# Patient Record
Sex: FEMALE | Race: WHITE | NOT HISPANIC OR LATINO | Employment: OTHER | URBAN - METROPOLITAN AREA
[De-identification: names, ages, dates, MRNs, and addresses within clinical notes are randomized per-mention and may not be internally consistent; named-entity substitution may affect disease eponyms.]

---

## 2017-03-01 ENCOUNTER — ALLSCRIPTS OFFICE VISIT (OUTPATIENT)
Dept: OTHER | Facility: OTHER | Age: 72
End: 2017-03-01

## 2017-03-01 DIAGNOSIS — Z11.59 ENCOUNTER FOR SCREENING FOR OTHER VIRAL DISEASES: ICD-10-CM

## 2017-03-29 ENCOUNTER — APPOINTMENT (OUTPATIENT)
Dept: LAB | Facility: CLINIC | Age: 72
End: 2017-03-29
Payer: COMMERCIAL

## 2017-03-29 ENCOUNTER — TRANSCRIBE ORDERS (OUTPATIENT)
Dept: LAB | Facility: CLINIC | Age: 72
End: 2017-03-29

## 2017-03-29 DIAGNOSIS — Z11.59 SCREENING EXAMINATION FOR POLIOMYELITIS: Primary | ICD-10-CM

## 2017-03-29 DIAGNOSIS — E11.9 DIABETES MELLITUS, STABLE (HCC): ICD-10-CM

## 2017-03-29 DIAGNOSIS — Z11.59 SCREENING EXAMINATION FOR POLIOMYELITIS: ICD-10-CM

## 2017-03-29 LAB
EST. AVERAGE GLUCOSE BLD GHB EST-MCNC: 154 MG/DL
GLUCOSE P FAST SERPL-MCNC: 89 MG/DL (ref 65–99)
HBA1C MFR BLD: 7 % (ref 4.2–6.3)

## 2017-03-29 PROCEDURE — 83036 HEMOGLOBIN GLYCOSYLATED A1C: CPT

## 2017-03-29 PROCEDURE — 36415 COLL VENOUS BLD VENIPUNCTURE: CPT

## 2017-03-29 PROCEDURE — 82951 GLUCOSE TOLERANCE TEST (GTT): CPT

## 2017-03-29 PROCEDURE — 86803 HEPATITIS C AB TEST: CPT

## 2017-03-30 LAB — HCV AB SER QL: NORMAL

## 2017-04-05 ENCOUNTER — ALLSCRIPTS OFFICE VISIT (OUTPATIENT)
Dept: OTHER | Facility: OTHER | Age: 72
End: 2017-04-05

## 2017-04-05 DIAGNOSIS — E11.9 TYPE 2 DIABETES MELLITUS WITHOUT COMPLICATIONS (HCC): ICD-10-CM

## 2017-04-19 ENCOUNTER — ALLSCRIPTS OFFICE VISIT (OUTPATIENT)
Dept: OTHER | Facility: OTHER | Age: 72
End: 2017-04-19

## 2017-07-25 DIAGNOSIS — E11.9 TYPE 2 DIABETES MELLITUS WITHOUT COMPLICATIONS (HCC): ICD-10-CM

## 2017-07-25 DIAGNOSIS — Z12.31 ENCOUNTER FOR SCREENING MAMMOGRAM FOR MALIGNANT NEOPLASM OF BREAST: ICD-10-CM

## 2017-08-23 ENCOUNTER — ALLSCRIPTS OFFICE VISIT (OUTPATIENT)
Dept: OTHER | Facility: OTHER | Age: 72
End: 2017-08-23

## 2017-10-26 ENCOUNTER — GENERIC CONVERSION - ENCOUNTER (OUTPATIENT)
Dept: OTHER | Facility: OTHER | Age: 72
End: 2017-10-26

## 2017-11-16 ENCOUNTER — TRANSCRIBE ORDERS (OUTPATIENT)
Dept: LAB | Facility: CLINIC | Age: 72
End: 2017-11-16

## 2017-11-16 ENCOUNTER — GENERIC CONVERSION - ENCOUNTER (OUTPATIENT)
Dept: OTHER | Facility: OTHER | Age: 72
End: 2017-11-16

## 2017-11-16 ENCOUNTER — APPOINTMENT (OUTPATIENT)
Dept: LAB | Facility: CLINIC | Age: 72
End: 2017-11-16
Payer: COMMERCIAL

## 2017-11-16 DIAGNOSIS — E11.9 TYPE 2 DIABETES MELLITUS WITHOUT COMPLICATIONS (HCC): ICD-10-CM

## 2017-11-16 LAB
EST. AVERAGE GLUCOSE BLD GHB EST-MCNC: 163 MG/DL
GLUCOSE P FAST SERPL-MCNC: 116 MG/DL (ref 65–99)
HBA1C MFR BLD: 7.3 % (ref 4.2–6.3)

## 2017-11-16 PROCEDURE — 82947 ASSAY GLUCOSE BLOOD QUANT: CPT

## 2017-11-16 PROCEDURE — 36415 COLL VENOUS BLD VENIPUNCTURE: CPT

## 2017-11-16 PROCEDURE — 83036 HEMOGLOBIN GLYCOSYLATED A1C: CPT

## 2017-12-06 ENCOUNTER — ALLSCRIPTS OFFICE VISIT (OUTPATIENT)
Dept: OTHER | Facility: OTHER | Age: 72
End: 2017-12-06

## 2017-12-06 DIAGNOSIS — E11.9 TYPE 2 DIABETES MELLITUS WITHOUT COMPLICATIONS (HCC): ICD-10-CM

## 2017-12-07 NOTE — PROGRESS NOTES
Assessment    1  Benign essential hypertension (401 1) (I10)  2  Diabetes mellitus type 2, insulin dependent (250 00,V58 67) (E11 9,Z79 4)  3  Depression with anxiety (300 4) (F41 8)    Plan  Depression with anxiety    · Start: Escitalopram Oxalate 10 MG Oral Tablet (Lexapro); 1/2 tab x 1 week than one a days  Generalized osteoarthritis of multiple sites    · * DXA BONE DENSITY SPINE HIP AND PELVIS; Status:Active; Requested for:43Ryr3236;   PMH: Encounter for screening colonoscopy    · COLONOSCOPY; Status:Canceled;   PMH: History of screening mammography    · * MAMMO SCREENING BILATERAL W CAD; Status:Active; Requested for:09Irr8463;   PMH: History of type 2 diabetes mellitus    · Stop: GlyBURIDE 5 MG Oral Tablet  Screening for genitourinary condition    · *VB - Urinary Incontinence Screen (Dx Z13 89 Screen for UI); Status:Complete - Retrospective ByProtocol Authorization;   Done: 59HFW9169 12:13PM  Screening for neurological condition    · *VB - Fall Risk Assessment  (Dx Z13 89 Screen for Neurologic Disorder); Status:Complete -Retrospective By Protocol Authorization;   Done: 69ROH9762 12:13PM  Type 2 diabetes mellitus    · (1) BASIC METABOLIC PROFILE; Status:Active; Requested for:26Ona6118;    · (1) HEMOGLOBIN A1C; Status:Active; Requested for:02Tqb9458;    · (1) LIPID PANEL, FASTING; Status:Active; Requested for:08Bwa1142;    · (1) MICROALBUMIN CREATININE RATIO, RANDOM URINE; Status:Active; Requested for:25Hda5403;    · (1) TSH; Status:Active; Requested for:03Xzm0263;    · (1) URINALYSIS (will reflex a microscopy if leukocytes, occult blood, protein or nitrites are not withinnormal limits); Status:Canceled;    · *VB - Eye Exam; Status:Active; Requested for:22Uei5958;    · *VB - Diabetic Eye Exam Co-Management  *  Status: Active  Requested for: 67TDW9438  () Care Summary provided  : Yes   · (1) HEMOGLOBIN A1C ; every 3 months;  Last 27BDC9931; Next 77GEE8573; Status:Active   · (1) MICROALBUMIN CREATININE RATIO, RANDOM URINE ; every 1 year; Last 20DQW7682; QLTU94BVN3158; Status:Active    Discussion/Summary  Counseling Documentation With Imm: The patient was counseled regarding diagnostic results,-- prognosis,-- impressions  Chief Complaint  Chief Complaint Free Text Note Form: pt here for 3 month f/u of HTNwould like to discuss pneumonia vaccine and shinglesblood work      History of Present Illness  Depression (Follow-Up): The patient states her depression has doing better, but been stable since the last visit  She has had no significant interval events  Interval Symptoms: The patient is currently asymptomatic  worsened depressed mood,-- worsened loss of interest or pleasure in activities,-- stable insomnia-- and-- worsened anxiety  Associated symptoms include: no recent weight gain,-- no recent weight loss,-- no thoughts of suicide,-- no social difficulties,-- no delusions,-- no racing thoughts,-- no employment difficulties,-- no auditory hallucinations,-- no periods of excess energy,-- no financial difficulties,-- no visual hallucinations,-- no periods of euphoria-- and-- no difficulty with activities of daily living  Hypertension (Follow-Up): The patient presents for follow-up of essential hypertension  The patient states she has been doing poorly with her blood pressure control since the last visit  She has no comorbid illnesses  She has no significant interval events  Symptoms: The patient is currently asymptomatic  denies dyspnea,-- denies chest pain,-- denies intermittent leg claudication,-- denies lower extremity edema-- and-- denies   Associated symptoms include increase BP Home BP ready requested  Diabetes Type II (Follow-Up): The patient states she has been doing well with her Type II Diabetes control since the last visit increase A1c , but was taking cortisone shot in knee, episodes of hypoglycemia in am  She has no comorbid illnesses  Complications: peripheral neuropathy-- and-- nephropathy   She has no known diabetic complications  She has no significant interval events  Symptoms: The patient is currently asymptomatic  denies numbness of the feet,-- denies a foot ulcer,-- denies foot pain,-- denies vomiting-- and-- denies visual impairment  Associated symptoms include no polyuria-- and-- no recent weight loss  Review of Systems  Complete-Female:  Constitutional: No fever, no chills, feels well, no tiredness, no recent weight gain or weight loss  Eyes: No complaints of eye pain, no red eyes, no eyesight problems, no discharge, no dry eyes, no itching of eyes  ENT: no complaints of earache, no loss of hearing, no nose bleeds, no nasal discharge, no sore throat, no hoarseness  Cardiovascular: No complaints of slow heart rate, no fast heart rate, no chest pain, no palpitations, no leg claudication, no lower extremity edema  Respiratory: No complaints of shortness of breath, no wheezing, no cough, no SOB on exertion, no orthopnea, no PND  Gastrointestinal: No complaints of abdominal pain, no constipation, no nausea or vomiting, no diarrhea, no bloody stools  Genitourinary: No complaints of dysuria, no incontinence, no pelvic pain, no dysmenorrhea, no vaginal discharge or bleeding  Musculoskeletal: No complaints of arthralgias, no myalgias, no joint swelling or stiffness, no limb pain or swelling  Integumentary: No complaints of skin rash or lesions, no itching, no skin wounds, no breast pain or lump  Psychiatric: anxiety-- and-- depression  Endocrine: No complaints of proptosis, no hot flashes, no muscle weakness, no deepening of the voice, no feelings of weakness  Preventive Quality 65 Older:  Preventive Quality 65 and Older: Falls Risk: The patient fell 0 times in the past 12 months  Symptoms Include: leg weakness  The patient is currently experiencing fall symptoms  The patient is currently experiencing urinary symptoms   Urinary Incontinence Symptoms includes: nocturia       Active Problems  1  Anemia (285 9) (D64 9)  2  Benign essential hypertension (401 1) (I10)  3  Cerebral infarction due to cerebral artery occlusion (434 91) (I63 50)  4  Depression with anxiety (300 4) (F41 8)  5  Diabetes mellitus type 2, insulin dependent (250 00,V58 67) (E11 9,Z79 4)  6  Gastroesophageal reflux disease without esophagitis (530 81) (K21 9)  7  Generalized osteoarthritis of multiple sites (715 09) (M15 9)  8  Insomnia (780 52) (G47 00)  9  Restless leg syndrome (333 94) (G25 81)  10  Type 2 diabetes mellitus (250 00) (E11 9)    Past Medical History  1  History of Acute non-recurrent maxillary sinusitis (461 0) (J01 00)  2  History of Anxiety (300 00) (F41 9)  3  History of Benign neoplasm of skin of upper limb, including shoulder (216 6) (D23 60)  4  History of Chronic low back pain (724 2,338 29) (M54 5,G89 29)  5  History of abscess of skin and subcutaneous tissue (V13 3) (Z87 2)  6  History of back pain (V13 59) (Z87 39)  7  History of benign neoplasm of skin (V13 3) (Z87 2)  8  History of dehydration (V12 29) (Z86 39)  9  History of eczema (V13 3) (Z87 2)  10  History of hypertension (V12 59) (Z86 79)  11  History of type 2 diabetes mellitus (V12 29) (Z86 39)  12  History of urinary tract infection (V13 02) (Z87 440)  13  History of Hordeolum externum of left eye (373 11) (H00 016)  14  History of Mental status change (780 97) (R41 82)  15  History of Pain in eye, unspecified laterality (379 91) (H57 10)  16  History of Restless legs syndrome (333 94) (G25 81)    Surgical History  1  History of Appendectomy  2  History of Cholecystectomy  3  History of Ear Surgery  4  History of Rotator Cuff Repair  5  History of Tonsillectomy With Adenoidectomy  6  History of Umbilical Hernia Repair    Family History  Mother   1  Adopted  Father   2  Adopted  Family History   3   Family history of Family Health Status 6  Children Living    Social History     · Former smoker (D79 76) (N98 148)   · Marital History - Currently    · No alcohol use   · No illicit drug use   · Occupation:   · Travel    Current Meds  1  AmLODIPine Besylate 10 MG Oral Tablet; TAKE 1 TABLET DAILY  Requested for: 26Jun2017; Last TT:72HNC2311 Ordered  2  Aspirin 81 MG TABS; TAKE 1 TABLET TWICE DAILY; Therapy: (Recorded:52Wqn0391) to Recorded  3  Atenolol 25 MG Oral Tablet; TAKE 1 TABLET DAILY; Therapy: 12YLH3413 to (Evaluate:02Oct2017)  Requested for: 67STH3030; Last Rx:05Apr2017 Ordered  4  Atenolol 50 MG Oral Tablet; TAKE 1 TABLET DAILY; Therapy: 18Hdh1889 to (Last Rx:78Siw8932)  Requested for: 38HQB0837 Ordered  5  [employer] Blood Glucose Test In Vitro Strip; TEST TWICE DAILY; Therapy: 50QZG4092 to ([facility])  Requested for: 32WJG0779; Last Rx:09Mar2016 Ordered  6  FreeStyle Lancets Miscellaneous; test twice daily; Therapy: 14GMD3589 to (Last Rx:97Vyq6536)  Requested for: 32Olb8695 Ordered  7  FreeStyle Lite Device; test twicw a day; Therapy: 84NTF4905 to (Last Rx:90Yck3143)  Requested for: 83Ixm2851 Ordered  8  FreeStyle Lite Test In Vitro Strip; test glucose twice daily DX: 250 02; Therapy: 73HSU9506 to (Last Rx:39Rwv1492)  Requested for: 44Vom6530 Ordered  9  GlyBURIDE 5 MG Oral Tablet; Take 1 tablet daily  Requested for: 27PAM0141; Last Rx:10Ejm9335 Ordered  10  Levemir FlexTouch 100 UNIT/ML Subcutaneous Solution Pen-injector; INJECT 28 UNIT Bedtime; Therapy: 81DZO1555 to  Requested for: 68WVY1645 Recorded  11  Levocetirizine Dihydrochloride 5 MG Oral Tablet; TAKE 1 TABLET DAILY IN THE EVENING AS  NEEDED; Therapy: 26YCM9399 to (Evaluate:18Apr2017)  Requested for: 65RQG1238; Last Rx:02Ffi6111  Ordered  12  Metaxalone 800 MG Oral Tablet; take one tablet by mouth at bedtime as needed; Therapy: 07Vvl7526 to (Last Rx:51Nyq4746)  Requested for: 63ZFG4990 Ordered  13  MetFORMIN HCl - 1000 MG Oral Tablet; TAKE 1 TABLET TWICE DAILY  Requested for: 35RQP3237;  Last Rx:35Zxd9549 Ordered  14   NovoFine Plus 32G X 4 MM Miscellaneous; USE 1 TIME DAILY WITH INSULIN PEN - DX  E11 9; Therapy: 57REG8502 to (Evaluate:19Bbf0445)  Requested for: 91JJP7599; Last Rx:20Nov2017  Ordered  15  Nystatin 487718 UNIT/GM External Powder; APPLY SPARINGLY TO AFFECTED AREA(S) TWICE  DAILY; Therapy: 10UGK6134 to (Evaluate:64Crg5026)  Requested for: 05Apr2017; Last Rx:05Apr2017  Ordered  16  Simvastatin 40 MG Oral Tablet; TAKE 1 TABLET DAILY; Therapy: 42IBV2543 to (Evaluate:31Mar2018)  Requested for: 05Apr2017; Last Rx:05Apr2017  Ordered  17  Tylenol TABS; Take as needed; Therapy: (Recorded:45Omf9954) to Recorded  Medication List Reviewed: The medication list was reviewed and updated today  Allergies  1  Zestril TABS  2  Lisinopril TABS  3  Omnicef CAPS  4  Penicillins  5  No Known Environmental Allergies  6  No Known Food Allergies    Vitals  Vital Signs    Recorded: 88ACW4907 11:44AM   Temperature 98 3 F, Oral   Heart Rate 84   Systolic 560, LUE, Sitting   Diastolic 82, LUE, Sitting   BP CUFF SIZE Large   Height 5 ft 7 5 in   Weight 261 lb 4 oz   BMI Calculated 40 31   BSA Calculated 2 28   O2 Saturation 98, RA       Physical Exam   Constitutional  General appearance: No acute distress, well appearing and well nourished  Eyes  Conjunctiva and lids: No swelling, erythema or discharge  Pupils and irises: Equal, round and reactive to light  Ears, Nose, Mouth, and Throat  External inspection of ears and nose: Normal    Oropharynx: Normal with no erythema, edema, exudate or lesions  Pulmonary  Respiratory effort: No increased work of breathing or signs of respiratory distress  Auscultation of lungs: Clear to auscultation  Cardiovascular  Auscultation of heart: Normal rate and rhythm, normal S1 and S2, without murmurs  Lymphatic  Palpation of lymph nodes in neck: No lymphadenopathy  Musculoskeletal  Gait and station: Normal    Digits and nails: Normal without clubbing or cyanosis     Inspection/palpation of joints, bones, and muscles: Normal    Skin Skin and subcutaneous tissue: Normal without rashes or lesions  Neurologic  Cranial nerves: Cranial nerves 2-12 intact  Reflexes: 2+ and symmetric  Sensation: No sensory loss  Psychiatric  Orientation to person, place, and time: Normal    Mood and affect: Normal    Diabetic Foot Screen: Normal    Socks and shoes removed, the Right Foot: the foot was normal, no swelling, no erythema  The toes on the right were normal   The sensory exam showed  normal vibratory sensation at the level of the toes on the right  Normal tactile sensation with monofilament testing throughout the right foot  Socks and shoes removed, Left Foot: the foot was normal, no swelling, no erythema  The toes on the left were normal   Pulses:  1+ in the posterior tibialis on the right  1+ in the dorsalis pedis on the right  Pulses:  1+ in the posterior tibialis on the left  1+ in the dorsalis pedis on the left  Results/Data  *VB - Fall Risk Assessment  (Dx Z13 89 Screen for Neurologic Disorder) 25TAX6286 12:13PM [facility] [place]     Test Name Result Flag Reference   Falls Risk      No falls in the past year     *VB - Urinary Incontinence Screen (Dx Z13 89 Screen for UI) 87NQH4735 12:13PM [facility]     Test Name Result Flag Reference   Urinary Incontinence Assessment 34MRQ3886       (1) GLUCOSE,  FASTING 57IVB4971 07:56AM [employer] Order Number: [id number]     Test Name Result Flag Reference   GLUCOSE FASTING 116 mg/dL H 65-99   Specimen collection should occur prior to Sulfasalazine administration due to the potential for falsely depressed results  Specimen collection should occur prior to Sulfapyridine administration due to the potential for falsely elevated results  (1) HEMOGLOBIN A1C 93XEI1824 07:56AM [facility] Order Number: [id number]     Test Name Result Flag Reference   HEMOGLOBIN A1C 7 3 % H 4 2-6 3   EST  AVG   GLUCOSE 163 mg/dl         Health Management  Type 2 diabetes mellitus (1) BASIC METABOLIC PROFILE; every 3 months; Last 79IRI5447; Next Due: 59Iee7259; Overdue  (1) HEMOGLOBIN A1C; every 3 months; Last 91PWO7521; Next Due: 82HFM3278; Active  (1) MICROALBUMIN CREATININE RATIO, RANDOM URINE; every 1 year; Last 07RCF6863; Next Due:49Emf5483;  Active    Signatures   Electronically signed by : Delia Gomez MD; Dec  6 2017 12:36PM EST                       (Author)    Electronically signed by : Delia Gomez MD; Dec  6 2017 12:37PM EST                       (Author)

## 2018-01-10 ENCOUNTER — GENERIC CONVERSION - ENCOUNTER (OUTPATIENT)
Dept: OTHER | Facility: OTHER | Age: 73
End: 2018-01-10

## 2018-01-12 VITALS
SYSTOLIC BLOOD PRESSURE: 136 MMHG | WEIGHT: 263.38 LBS | HEIGHT: 67 IN | TEMPERATURE: 97.2 F | HEART RATE: 84 BPM | DIASTOLIC BLOOD PRESSURE: 84 MMHG | OXYGEN SATURATION: 95 % | BODY MASS INDEX: 41.34 KG/M2

## 2018-01-12 VITALS
HEART RATE: 73 BPM | SYSTOLIC BLOOD PRESSURE: 138 MMHG | HEIGHT: 68 IN | OXYGEN SATURATION: 98 % | TEMPERATURE: 98.3 F | WEIGHT: 254.38 LBS | BODY MASS INDEX: 38.55 KG/M2 | DIASTOLIC BLOOD PRESSURE: 84 MMHG

## 2018-01-14 VITALS
TEMPERATURE: 97.6 F | DIASTOLIC BLOOD PRESSURE: 70 MMHG | WEIGHT: 263.38 LBS | BODY MASS INDEX: 41.34 KG/M2 | OXYGEN SATURATION: 93 % | SYSTOLIC BLOOD PRESSURE: 142 MMHG | HEIGHT: 67 IN | HEART RATE: 102 BPM

## 2018-01-15 VITALS
BODY MASS INDEX: 41.61 KG/M2 | SYSTOLIC BLOOD PRESSURE: 150 MMHG | TEMPERATURE: 98.6 F | HEART RATE: 86 BPM | DIASTOLIC BLOOD PRESSURE: 88 MMHG | HEIGHT: 67 IN | OXYGEN SATURATION: 97 % | WEIGHT: 265.13 LBS

## 2018-01-18 ENCOUNTER — GENERIC CONVERSION - ENCOUNTER (OUTPATIENT)
Dept: OTHER | Facility: OTHER | Age: 73
End: 2018-01-18

## 2018-01-23 VITALS
SYSTOLIC BLOOD PRESSURE: 124 MMHG | TEMPERATURE: 98.3 F | HEART RATE: 84 BPM | WEIGHT: 261.25 LBS | BODY MASS INDEX: 39.59 KG/M2 | OXYGEN SATURATION: 98 % | HEIGHT: 68 IN | DIASTOLIC BLOOD PRESSURE: 82 MMHG

## 2018-01-24 VITALS
WEIGHT: 257.38 LBS | HEART RATE: 102 BPM | BODY MASS INDEX: 39.01 KG/M2 | TEMPERATURE: 98 F | DIASTOLIC BLOOD PRESSURE: 70 MMHG | SYSTOLIC BLOOD PRESSURE: 148 MMHG | HEIGHT: 68 IN | OXYGEN SATURATION: 96 %

## 2018-01-24 VITALS
BODY MASS INDEX: 39.46 KG/M2 | WEIGHT: 260.38 LBS | TEMPERATURE: 98 F | SYSTOLIC BLOOD PRESSURE: 140 MMHG | DIASTOLIC BLOOD PRESSURE: 78 MMHG | HEART RATE: 116 BPM | OXYGEN SATURATION: 98 % | HEIGHT: 68 IN

## 2018-02-06 ENCOUNTER — OFFICE VISIT (OUTPATIENT)
Dept: INTERNAL MEDICINE CLINIC | Facility: CLINIC | Age: 73
End: 2018-02-06
Payer: COMMERCIAL

## 2018-02-06 VITALS
BODY MASS INDEX: 40.12 KG/M2 | DIASTOLIC BLOOD PRESSURE: 74 MMHG | TEMPERATURE: 98.5 F | WEIGHT: 255.6 LBS | SYSTOLIC BLOOD PRESSURE: 130 MMHG | OXYGEN SATURATION: 98 % | HEIGHT: 67 IN | HEART RATE: 96 BPM

## 2018-02-06 DIAGNOSIS — I10 BENIGN ESSENTIAL HYPERTENSION: Primary | ICD-10-CM

## 2018-02-06 DIAGNOSIS — E11.9 DIABETES MELLITUS TYPE 2, INSULIN DEPENDENT (HCC): ICD-10-CM

## 2018-02-06 DIAGNOSIS — K21.9 GASTROESOPHAGEAL REFLUX DISEASE WITHOUT ESOPHAGITIS: ICD-10-CM

## 2018-02-06 DIAGNOSIS — G89.29 CHRONIC BILATERAL THORACIC BACK PAIN: ICD-10-CM

## 2018-02-06 DIAGNOSIS — Z79.4 DIABETES MELLITUS TYPE 2, INSULIN DEPENDENT (HCC): ICD-10-CM

## 2018-02-06 DIAGNOSIS — M54.6 CHRONIC BILATERAL THORACIC BACK PAIN: ICD-10-CM

## 2018-02-06 DIAGNOSIS — E55.9 VITAMIN D DEFICIENCY: ICD-10-CM

## 2018-02-06 PROBLEM — M54.9 BACK PAIN: Status: ACTIVE | Noted: 2017-04-19

## 2018-02-06 PROBLEM — R60.0 BILATERAL LOWER EXTREMITY EDEMA: Status: ACTIVE | Noted: 2018-01-10

## 2018-02-06 PROBLEM — B00.1 COLD SORE: Status: ACTIVE | Noted: 2018-01-18

## 2018-02-06 PROCEDURE — 99214 OFFICE O/P EST MOD 30 MIN: CPT | Performed by: INTERNAL MEDICINE

## 2018-02-06 RX ORDER — AMLODIPINE BESYLATE 10 MG/1
1 TABLET ORAL DAILY
COMMUNITY
End: 2018-06-05 | Stop reason: SDUPTHER

## 2018-02-06 RX ORDER — ATENOLOL 25 MG/1
1 TABLET ORAL DAILY
COMMUNITY
Start: 2015-03-10 | End: 2018-02-12 | Stop reason: SDUPTHER

## 2018-02-06 RX ORDER — ACETAMINOPHEN 325 MG/1
325 TABLET ORAL EVERY 6 HOURS PRN
COMMUNITY

## 2018-02-06 RX ORDER — HYDROCHLOROTHIAZIDE 12.5 MG/1
1 TABLET ORAL DAILY
COMMUNITY
Start: 2018-01-10 | End: 2018-06-05 | Stop reason: SDUPTHER

## 2018-02-06 RX ORDER — ESCITALOPRAM OXALATE 10 MG/1
TABLET ORAL
COMMUNITY
Start: 2017-12-06 | End: 2018-05-15 | Stop reason: SDUPTHER

## 2018-02-06 RX ORDER — ATENOLOL 50 MG/1
1 TABLET ORAL DAILY
COMMUNITY
Start: 2014-09-15 | End: 2018-02-12 | Stop reason: SDUPTHER

## 2018-02-06 RX ORDER — NYSTATIN 100000 [USP'U]/G
POWDER TOPICAL AS NEEDED
COMMUNITY
Start: 2014-01-24

## 2018-02-06 RX ORDER — TRAMADOL HYDROCHLORIDE 50 MG/1
50 TABLET ORAL EVERY 8 HOURS PRN
Qty: 30 TABLET | Refills: 0 | Status: SHIPPED | OUTPATIENT
Start: 2018-02-06 | End: 2018-07-09 | Stop reason: ALTCHOICE

## 2018-02-06 RX ORDER — SIMVASTATIN 40 MG
1 TABLET ORAL DAILY
COMMUNITY
Start: 2013-11-12 | End: 2018-04-30 | Stop reason: SDUPTHER

## 2018-02-06 NOTE — ASSESSMENT & PLAN NOTE
Continue with metformin 1000 mg twice a day and will increase Levemir to 32 units every morning  Recheck hemoglobin A1c  Advised patient continue to check her blood sugars twice a day and have her call our offices with results

## 2018-02-06 NOTE — ASSESSMENT & PLAN NOTE
Will prescribe tramadol 50 mg Q8H-PRN for moderate pain  Encourage stretching and exercise plans   Patient deferred on PT

## 2018-02-06 NOTE — PROGRESS NOTES
Assessment/Plan:    Gastroesophageal reflux disease without esophagitis   Stable  Continue with lifestyle modifications  Diabetes mellitus type 2, insulin dependent ([facility] [address] )   Continue with metformin 1000 mg twice a day and will increase Levemir to 32 units every morning  Recheck hemoglobin A1c  Advised patient continue to check her blood sugars twice a day and have her call our offices with results  Benign essential hypertension  Stable, Continue with current regimen (HCTZ 12 5mg daily, Norvasc 10mg daily)  Back pain    Will prescribe tramadol 50 mg Q8H-PRN for moderate pain  Encourage stretching and exercise plans  Patient deferred on PT  Diagnoses and all orders for this visit:    Benign essential hypertension    Diabetes mellitus type 2, insulin dependent ([facility])  -     insulin detemir (LEVEMIR FLEXTOUCH) subcutaneous injection pen 100 units/mL; Inject 32 Units under the skin every morning    Gastroesophageal reflux disease without esophagitis    Chronic bilateral thoracic back pain  -     traMADol (ULTRAM) 50 mg tablet; Take 1 tablet (50 mg total) by mouth every 8 (eight) hours as needed for moderate pain    Vitamin D deficiency  -     Vitamin D 25 hydroxy; Future    Other orders  -     aspirin 81 MG tablet; Take 1 tablet by mouth 2 (two) times a day  -     amLODIPine (NORVASC) 10 mg tablet; Take 1 tablet by mouth daily  -     atenolol (TENORMIN) 25 mg tablet; Take 1 tablet by mouth daily  -     atenolol (TENORMIN) 50 mg tablet; Take 1 tablet by mouth daily  -     escitalopram (LEXAPRO) 10 mg tablet; Take by mouth  -     hydrochlorothiazide (HYDRODIURIL) 12 5 mg tablet; Take 1 tablet by mouth daily  -     Discontinue: insulin detemir (LEVEMIR FLEXTOUCH) subcutaneous injection pen 100 units/mL; Inject under the skin  -     metFORMIN (GLUCOPHAGE) 1000 MG tablet; Take 1 tablet by mouth 2 (two) times a day  -     simvastatin (ZOCOR) 40 mg tablet;  Take 1 tablet by mouth daily  -     nystatin (MYCOSTATIN) powder; Apply topically 2 (two) times a day  -     acetaminophen (TYLENOL) 325 mg tablet; Take by mouth          Subjective:      Patient ID: Sofia Davis is a 67 y o  female  The following portions of the patient's history were reviewed and updated as appropriate: allergies, current medications, past family history, past medical history, past social history, past surgical history and problem list     70-year-old female is seen today for follow-up  She reports compliance with her medication regimen and started a diabetic diet to which she counts her carbohydrate intake however despite diet modifications, blood sugars remained uncontrolled  Blood sugar ranging from 160-180  She has also taken metformin 1000 mg twice a day  She was previously on 30 mg glyburide daily however this was discontinued due to frequent hypoglycemic episodes  Neck Pain    This is a chronic problem  The current episode started more than 1 year ago  The problem occurs constantly  The problem has been gradually worsening  The pain is present in the left side and right side  The quality of the pain is described as aching  The pain is at a severity of 8/10  The pain is moderate  The symptoms are aggravated by bending, position and twisting  The pain is same all the time  Stiffness is present all day  Pertinent negatives include no chest pain or fever  She has tried acetaminophen for the symptoms  The treatment provided no relief  Diabetes   She presents for her follow-up diabetic visit  She has type 2 diabetes mellitus  Her disease course has been fluctuating  Hypoglycemia symptoms include hunger  Pertinent negatives for hypoglycemia include no dizziness  Pertinent negatives for diabetes include no chest pain and no fatigue  There are no hypoglycemic complications  Symptoms are worsening  Diabetic complications include peripheral neuropathy   Risk factors for coronary artery disease include obesity, sedentary lifestyle, post-menopausal and diabetes mellitus  Current diabetic treatment includes oral agent (monotherapy) and insulin injections  She is compliant with treatment all of the time  She is following a generally healthy diet  She never participates in exercise  Her home blood glucose trend is increasing steadily  Her breakfast blood glucose is taken between 9-10 am  Her breakfast blood glucose range is generally 140-180 mg/dl  Her dinner blood glucose is taken between 6-7 pm  Her dinner blood glucose range is generally 180-200 mg/dl  An ACE inhibitor/angiotensin II receptor blocker is not being taken  Review of Systems   Constitutional: Negative for activity change, appetite change, diaphoresis, fatigue and fever  HENT: Negative for congestion, postnasal drip, rhinorrhea, sinus pain, sinus pressure and sore throat  Eyes: Negative for discharge and itching  Respiratory: Negative for apnea, cough and shortness of breath  Cardiovascular: Negative for chest pain  Gastrointestinal: Negative for abdominal distention, abdominal pain, anal bleeding, blood in stool, constipation, diarrhea, nausea and vomiting  Endocrine: Negative for cold intolerance and heat intolerance  Genitourinary: Negative for difficulty urinating, dysuria and hematuria  Musculoskeletal: Positive for back pain, neck pain and neck stiffness  Negative for arthralgias, gait problem, joint swelling and myalgias  Skin: Negative  Neurological: Negative for dizziness and light-headedness  Hematological: Negative for adenopathy  Psychiatric/Behavioral: Negative for agitation, behavioral problems, hallucinations, sleep disturbance and suicidal ideas           Past Medical History:   Diagnosis Date    Anxiety     Depression     [facility]          Current Outpatient Prescriptions:     acetaminophen (TYLENOL) 325 mg tablet, Take by mouth, Disp: , Rfl:     amLODIPine (NORVASC) 10 mg tablet, Take 1 tablet by mouth daily, Disp: , Rfl:     aspirin 81 MG tablet, Take 1 tablet by mouth 2 (two) times a day, Disp: , Rfl:     atenolol (TENORMIN) 25 mg tablet, Take 1 tablet by mouth daily, Disp: , Rfl:     atenolol (TENORMIN) 50 mg tablet, Take 1 tablet by mouth daily, Disp: , Rfl:     hydrochlorothiazide (HYDRODIURIL) 12 5 mg tablet, Take 1 tablet by mouth daily, Disp: , Rfl:     insulin detemir (LEVEMIR FLEXTOUCH) subcutaneous injection pen 100 units/mL, Inject 32 Units under the skin every morning, Disp: 5 pen, Rfl: 0    metFORMIN (GLUCOPHAGE) 1000 MG tablet, Take 1 tablet by mouth 2 (two) times a day, Disp: , Rfl:     nystatin (MYCOSTATIN) powder, Apply topically 2 (two) times a day, Disp: , Rfl:     simvastatin (ZOCOR) 40 mg tablet, Take 1 tablet by mouth daily, Disp: , Rfl:     escitalopram (LEXAPRO) 10 mg tablet, Take by mouth, Disp: , Rfl:     traMADol (ULTRAM) 50 mg tablet, Take 1 tablet (50 mg total) by mouth every 8 (eight) hours as needed for moderate pain, Disp: 30 tablet, Rfl: 0    Allergies   Allergen Reactions    Cefdinir      Category: Allergy;     Penicillins     Lisinopril Rash     Category: Allergy; Category: Allergy; Annotation - [id number]: ALL FORMS       Social History   Past Surgical History:   Procedure Laterality Date    APPENDECTOMY      CHOLECYSTECTOMY      HERNIA REPAIR       Family History   Problem Relation Age of Onset    No Known Problems Mother     No Known Problems Father        Objective:  /74 (BP Location: Left arm, Patient Position: Sitting, Cuff Size: Large)   Pulse 96   Temp 98 5 °F (36 9 °C) (Oral)   Ht 5' 6 5" (1 689 m)   Wt 116 kg (255 lb 9 6 oz)   SpO2 98%   BMI 40 64 kg/m²     No results found for this or any previous visit (from the past 1344 hour(s))  Physical Exam   Constitutional: She is oriented to person, place, and time  She appears well-developed and well-nourished  HENT:   Head: Normocephalic and atraumatic     Eyes: Conjunctivae and EOM are normal  Pupils are equal, round, and reactive to light  Neck: Normal range of motion  Neck supple  No JVD present  No thyromegaly present  Cardiovascular: Normal rate, normal heart sounds and intact distal pulses  Exam reveals no gallop  No murmur heard  Pulmonary/Chest: Effort normal and breath sounds normal  No respiratory distress  She has no wheezes  She has no rales  She exhibits no tenderness  Abdominal: Soft  Bowel sounds are normal  She exhibits no distension  There is no tenderness  Musculoskeletal: Normal range of motion  She exhibits edema (+1 B/L edema)  She exhibits no tenderness or deformity  Lymphadenopathy:     She has no cervical adenopathy  Neurological: She is alert and oriented to person, place, and time  No cranial nerve deficit  Skin: Skin is warm and dry  No rash noted  No erythema  No pallor  Psychiatric: She has a normal mood and affect  Her behavior is normal  Judgment and thought content normal    Nursing note and vitals reviewed

## 2018-02-12 DIAGNOSIS — I10 HYPERTENSION, UNSPECIFIED TYPE: Primary | ICD-10-CM

## 2018-02-12 RX ORDER — ATENOLOL 25 MG/1
25 TABLET ORAL DAILY
Qty: 30 TABLET | Refills: 3 | Status: SHIPPED | OUTPATIENT
Start: 2018-02-12 | End: 2018-06-05 | Stop reason: SDUPTHER

## 2018-02-12 RX ORDER — ATENOLOL 50 MG/1
50 TABLET ORAL DAILY
Qty: 30 TABLET | Refills: 3 | Status: SHIPPED | OUTPATIENT
Start: 2018-02-12 | End: 2018-06-05 | Stop reason: SDUPTHER

## 2018-02-20 DIAGNOSIS — Z79.4 TYPE 2 DIABETES MELLITUS WITH COMPLICATION, WITH LONG-TERM CURRENT USE OF INSULIN (HCC): Primary | ICD-10-CM

## 2018-02-20 DIAGNOSIS — E11.8 TYPE 2 DIABETES MELLITUS WITH COMPLICATION, WITH LONG-TERM CURRENT USE OF INSULIN (HCC): Primary | ICD-10-CM

## 2018-03-26 DIAGNOSIS — E11.9 DIABETES MELLITUS TYPE 2, INSULIN DEPENDENT (HCC): ICD-10-CM

## 2018-03-26 DIAGNOSIS — Z79.4 DIABETES MELLITUS TYPE 2, INSULIN DEPENDENT (HCC): ICD-10-CM

## 2018-03-26 RX ORDER — INSULIN DETEMIR 100 [IU]/ML
32 INJECTION, SOLUTION SUBCUTANEOUS EVERY MORNING
Qty: 6 ML | Refills: 0 | OUTPATIENT
Start: 2018-03-26

## 2018-03-27 DIAGNOSIS — E11.9 DIABETES MELLITUS TYPE 2, INSULIN DEPENDENT (HCC): ICD-10-CM

## 2018-03-27 DIAGNOSIS — Z79.4 DIABETES MELLITUS TYPE 2, INSULIN DEPENDENT (HCC): ICD-10-CM

## 2018-04-25 RX ORDER — PEN NEEDLE, DIABETIC 31 GX5/16"
NEEDLE, DISPOSABLE MISCELLANEOUS
Qty: 100 EACH | Refills: 0 | OUTPATIENT
Start: 2018-04-25

## 2018-04-27 RX ORDER — SIMVASTATIN 40 MG
TABLET ORAL
Qty: 90 TABLET | Refills: 2 | OUTPATIENT
Start: 2018-04-27

## 2018-04-30 DIAGNOSIS — E78.00 HYPERCHOLESTEROLEMIA: Primary | ICD-10-CM

## 2018-04-30 RX ORDER — SIMVASTATIN 40 MG
40 TABLET ORAL DAILY
Qty: 30 TABLET | Refills: 3 | Status: SHIPPED | OUTPATIENT
Start: 2018-04-30 | End: 2018-06-05 | Stop reason: SDUPTHER

## 2018-05-11 DIAGNOSIS — I10 HYPERTENSION, UNSPECIFIED TYPE: ICD-10-CM

## 2018-05-14 RX ORDER — ESCITALOPRAM OXALATE 10 MG/1
TABLET ORAL
Qty: 30 TABLET | Refills: 1 | OUTPATIENT
Start: 2018-05-14

## 2018-05-14 RX ORDER — ATENOLOL 50 MG/1
TABLET ORAL
Qty: 30 TABLET | Refills: 2 | OUTPATIENT
Start: 2018-05-14

## 2018-05-15 DIAGNOSIS — E11.9 TYPE 2 DIABETES MELLITUS TREATED WITH INSULIN (HCC): Primary | ICD-10-CM

## 2018-05-15 DIAGNOSIS — Z79.4 TYPE 2 DIABETES MELLITUS TREATED WITH INSULIN (HCC): Primary | ICD-10-CM

## 2018-05-15 DIAGNOSIS — F41.8 DEPRESSION WITH ANXIETY: ICD-10-CM

## 2018-05-15 RX ORDER — ESCITALOPRAM OXALATE 10 MG/1
10 TABLET ORAL DAILY
Qty: 30 TABLET | Refills: 0 | Status: SHIPPED | OUTPATIENT
Start: 2018-05-15 | End: 2018-06-05 | Stop reason: SDUPTHER

## 2018-05-24 ENCOUNTER — TRANSCRIBE ORDERS (OUTPATIENT)
Dept: LAB | Facility: CLINIC | Age: 73
End: 2018-05-24

## 2018-05-24 ENCOUNTER — APPOINTMENT (OUTPATIENT)
Dept: LAB | Facility: CLINIC | Age: 73
End: 2018-05-24
Payer: COMMERCIAL

## 2018-05-24 DIAGNOSIS — E11.9 TYPE 2 DIABETES MELLITUS WITHOUT COMPLICATIONS (HCC): ICD-10-CM

## 2018-05-24 DIAGNOSIS — E55.9 VITAMIN D DEFICIENCY: ICD-10-CM

## 2018-05-24 LAB
25(OH)D3 SERPL-MCNC: 23.9 NG/ML (ref 30–100)
ANION GAP SERPL CALCULATED.3IONS-SCNC: 5 MMOL/L (ref 4–13)
BUN SERPL-MCNC: 18 MG/DL (ref 5–25)
CALCIUM SERPL-MCNC: 9.6 MG/DL (ref 8.3–10.1)
CHLORIDE SERPL-SCNC: 105 MMOL/L (ref 100–108)
CHOLEST SERPL-MCNC: 80 MG/DL (ref 50–200)
CO2 SERPL-SCNC: 31 MMOL/L (ref 21–32)
CREAT SERPL-MCNC: 0.7 MG/DL (ref 0.6–1.3)
CREAT UR-MCNC: 131 MG/DL
EST. AVERAGE GLUCOSE BLD GHB EST-MCNC: 157 MG/DL
GFR SERPL CREATININE-BSD FRML MDRD: 87 ML/MIN/1.73SQ M
GLUCOSE P FAST SERPL-MCNC: 159 MG/DL (ref 65–99)
HBA1C MFR BLD: 7.1 % (ref 4.2–6.3)
HDLC SERPL-MCNC: 39 MG/DL (ref 40–60)
LDLC SERPL CALC-MCNC: 14 MG/DL (ref 0–100)
MICROALBUMIN UR-MCNC: 236 MG/L (ref 0–20)
MICROALBUMIN/CREAT 24H UR: 180 MG/G CREATININE (ref 0–30)
NONHDLC SERPL-MCNC: 41 MG/DL
POTASSIUM SERPL-SCNC: 3.8 MMOL/L (ref 3.5–5.3)
SODIUM SERPL-SCNC: 141 MMOL/L (ref 136–145)
TRIGL SERPL-MCNC: 137 MG/DL
TSH SERPL DL<=0.05 MIU/L-ACNC: 2.54 UIU/ML (ref 0.36–3.74)

## 2018-05-24 PROCEDURE — 83036 HEMOGLOBIN GLYCOSYLATED A1C: CPT

## 2018-05-24 PROCEDURE — 84443 ASSAY THYROID STIM HORMONE: CPT

## 2018-05-24 PROCEDURE — 3060F POS MICROALBUMINURIA REV: CPT | Performed by: INTERNAL MEDICINE

## 2018-05-24 PROCEDURE — 80048 BASIC METABOLIC PNL TOTAL CA: CPT

## 2018-05-24 PROCEDURE — 82306 VITAMIN D 25 HYDROXY: CPT

## 2018-05-24 PROCEDURE — 82570 ASSAY OF URINE CREATININE: CPT

## 2018-05-24 PROCEDURE — 80061 LIPID PANEL: CPT

## 2018-05-24 PROCEDURE — 82043 UR ALBUMIN QUANTITATIVE: CPT

## 2018-05-24 PROCEDURE — 36415 COLL VENOUS BLD VENIPUNCTURE: CPT

## 2018-06-05 ENCOUNTER — OFFICE VISIT (OUTPATIENT)
Dept: INTERNAL MEDICINE CLINIC | Facility: CLINIC | Age: 73
End: 2018-06-05
Payer: COMMERCIAL

## 2018-06-05 VITALS
HEART RATE: 83 BPM | DIASTOLIC BLOOD PRESSURE: 80 MMHG | HEIGHT: 67 IN | OXYGEN SATURATION: 96 % | TEMPERATURE: 97.9 F | BODY MASS INDEX: 39.93 KG/M2 | WEIGHT: 254.4 LBS | SYSTOLIC BLOOD PRESSURE: 126 MMHG

## 2018-06-05 DIAGNOSIS — E78.2 MIXED HYPERLIPIDEMIA: ICD-10-CM

## 2018-06-05 DIAGNOSIS — I10 HYPERTENSION, UNSPECIFIED TYPE: ICD-10-CM

## 2018-06-05 DIAGNOSIS — F41.8 DEPRESSION WITH ANXIETY: ICD-10-CM

## 2018-06-05 DIAGNOSIS — I10 BENIGN ESSENTIAL HYPERTENSION: Primary | ICD-10-CM

## 2018-06-05 DIAGNOSIS — R60.0 BILATERAL LOWER EXTREMITY EDEMA: ICD-10-CM

## 2018-06-05 DIAGNOSIS — E78.00 HYPERCHOLESTEROLEMIA: ICD-10-CM

## 2018-06-05 DIAGNOSIS — E55.9 VITAMIN D DEFICIENCY: ICD-10-CM

## 2018-06-05 DIAGNOSIS — E11.9 DIABETES MELLITUS TYPE 2, INSULIN DEPENDENT (HCC): ICD-10-CM

## 2018-06-05 DIAGNOSIS — I63.50 CEREBRAL INFARCTION DUE TO CEREBRAL ARTERY OCCLUSION (HCC): ICD-10-CM

## 2018-06-05 DIAGNOSIS — G25.81 RESTLESS LEG SYNDROME: ICD-10-CM

## 2018-06-05 DIAGNOSIS — M15.9 GENERALIZED OSTEOARTHRITIS OF MULTIPLE SITES: ICD-10-CM

## 2018-06-05 DIAGNOSIS — Z79.4 DIABETES MELLITUS TYPE 2, INSULIN DEPENDENT (HCC): ICD-10-CM

## 2018-06-05 PROCEDURE — 99214 OFFICE O/P EST MOD 30 MIN: CPT | Performed by: FAMILY MEDICINE

## 2018-06-05 RX ORDER — ERGOCALCIFEROL 1.25 MG/1
50000 CAPSULE ORAL WEEKLY
Qty: 8 CAPSULE | Refills: 0 | Status: SHIPPED | OUTPATIENT
Start: 2018-06-05 | End: 2019-04-03

## 2018-06-05 RX ORDER — HYDROCHLOROTHIAZIDE 25 MG/1
25 TABLET ORAL DAILY
Qty: 90 TABLET | Refills: 1 | Status: SHIPPED | OUTPATIENT
Start: 2018-06-05 | End: 2018-11-30 | Stop reason: SDUPTHER

## 2018-06-05 RX ORDER — ATENOLOL 50 MG/1
50 TABLET ORAL DAILY
Qty: 30 TABLET | Refills: 0 | Status: SHIPPED | OUTPATIENT
Start: 2018-06-05 | End: 2018-07-09 | Stop reason: SDUPTHER

## 2018-06-05 RX ORDER — ATENOLOL 25 MG/1
25 TABLET ORAL DAILY
Qty: 90 TABLET | Refills: 1 | Status: SHIPPED | OUTPATIENT
Start: 2018-06-05 | End: 2018-12-10 | Stop reason: SDUPTHER

## 2018-06-05 RX ORDER — HYDROCHLOROTHIAZIDE 12.5 MG/1
25 TABLET ORAL DAILY
Qty: 90 TABLET | Refills: 1 | Status: SHIPPED | OUTPATIENT
Start: 2018-06-05 | End: 2018-06-05 | Stop reason: SDUPTHER

## 2018-06-05 RX ORDER — AMLODIPINE BESYLATE 5 MG/1
5 TABLET ORAL DAILY
Qty: 90 TABLET | Refills: 1 | Status: SHIPPED | OUTPATIENT
Start: 2018-06-05 | End: 2018-12-03 | Stop reason: SDUPTHER

## 2018-06-05 RX ORDER — AMLODIPINE BESYLATE 10 MG/1
5 TABLET ORAL DAILY
Qty: 90 TABLET | Refills: 1 | Status: SHIPPED | OUTPATIENT
Start: 2018-06-05 | End: 2018-06-05 | Stop reason: SDUPTHER

## 2018-06-05 RX ORDER — SIMVASTATIN 40 MG
40 TABLET ORAL DAILY
Qty: 90 TABLET | Refills: 1 | Status: SHIPPED | OUTPATIENT
Start: 2018-06-05 | End: 2019-01-21 | Stop reason: SDUPTHER

## 2018-06-05 RX ORDER — ESCITALOPRAM OXALATE 10 MG/1
10 TABLET ORAL DAILY
Qty: 90 TABLET | Refills: 1 | Status: SHIPPED | OUTPATIENT
Start: 2018-06-05 | End: 2018-12-26 | Stop reason: SDUPTHER

## 2018-06-05 NOTE — TELEPHONE ENCOUNTER
Pharmacy asked that new scripts be sent for Amlodipine and HCTZ  Scripts were ordered yesterday and quantity being prescribed didn't match instructions  They wanted scripts changed, so that it would be less confusing for the pt

## 2018-06-05 NOTE — PROGRESS NOTES
Assessment/Plan:    No problem-specific Assessment & Plan notes found for this encounter  1  Benign essential hypertension (401 1) (I10)  2  Diabetes mellitus type 2, insulin dependent (250 00,V58 67) (E11 9,Z79 4)  3  Depression with anxiety (300 4) (F41 8)  Mixed HLD  Vit D   RLS       Problem List Items Addressed This Visit        Endocrine    Diabetes mellitus type 2, insulin dependent ([address] )       Cardiovascular and Mediastinum    Benign essential hypertension - Primary    Cerebral infarction due to cerebral artery occlusion (HCC)       Musculoskeletal and Integument    Generalized osteoarthritis of multiple sites       Other    Bilateral lower extremity edema    Depression with anxiety    Mixed hyperlipidemia    Restless leg syndrome    Vitamin D deficiency      Other Visit Diagnoses     Hypertension, unspecified type        Hypercholesterolemia                Discussion: increase levemir to 34 units, labs reviewed  LE edema due to norvasc,- decrease norvasc to 5 mg and increase HCTZ to 25 mg and check BP , cont with ASA, repet labs in 3-4 months, replete vit D weekly      Subjective:      Patient ID: Thomas Monaco is a 67 y o  female  HPI  Depression (Follow-Up): The patient states her depression has doing better, but been stable since the last visit  She has had no significant interval events  Interval Symptoms: The patient is currently asymptomatic  worsened depressed mood,-- worsened loss of interest or pleasure in activities,-- stable insomnia-- and-- worsened anxiety  Associated symptoms include: no recent weight gain,-- no recent weight loss,-- no thoughts of suicide,-- no social difficulties,-- no delusions,-- no racing thoughts,-- no employment difficulties,-- no auditory hallucinations,-- no periods of excess energy,-- no financial difficulties,-- no visual hallucinations,-- no periods of euphoria-- and-- no difficulty with activities of daily living  Hypertension (Follow-Up):  The patient presents for follow-up of essential hypertension  The patient states she has been doing poorly with her blood pressure control since the last visit  She has no comorbid illnesses  She has no significant interval events  Symptoms: The patient is currently asymptomatic  denies dyspnea,-- denies chest pain,-- denies intermittent leg claudication,--  +  lower extremity edema-- and-- denies   Associated symptoms include increase BP Home BP ready requested  Diabetes Type II (Follow-Up): The patient states she has been doing well with her Type II Diabetes control since the last visit increase A1c , but was taking cortisone shot in knee, episodes of hypoglycemia in am  She has no comorbid illnesses  Complications: peripheral neuropathy-- and-- nephropathy  She has no known diabetic complications  She has no significant interval events  Symptoms: The patient is currently asymptomatic  denies numbness of the feet,-- denies a foot ulcer,-- denies foot pain,-- denies vomiting-- and-- denies visual impairment  Associated symptoms include no polyuria-- and-- no recent weight loss       Aic is 7 01, 7 3 and now 7 1    Review of Systems      Constitutional:  Denies fever or chills   Eyes:  Denies change in visual acuity   HENT:  Denies nasal congestion or sore throat   Respiratory:  Denies cough or shortness of breath or wheezing  Cardiovascular:  Denies palpitations or chest pain, LE edema  GI:  Denies abdominal pain, nausea, or vomiting  Integument:  Denies rash   Neurologic:  Denies headache or focal weakness        Objective:      /80 (BP Location: Left arm, Patient Position: Sitting, Cuff Size: Adult)   Pulse 83   Temp 97 9 °F (36 6 °C) (Oral)   Ht 5' 6 5" (1 689 m)   Wt 115 kg (254 lb 6 4 oz)   SpO2 96%   BMI 40 45 kg/m²          Physical Exam      Constitutional:  Well developed, well nourished, no acute distress, non-toxic appearance   Eyes:  PERRL, conjunctiva normal , non icteric sclera  HENT: Atraumatic, oropharynx moist  Neck-  supple   Respiratory:  CTA b/l, normal breath sounds, no rales, no wheezing   Cardiovascular:  RRR, no murmurs, + 1 LE edema b/l  GI:  Soft, nondistended, normal bowel sounds x 4, nontender, no organomegaly, no mass, no rebound, no guarding   Neurologic:  no focal deficits noted   Psychiatric:  Speech and behavior appropriate , AAO x 3  Skin: mild ecchymosis on arms

## 2018-06-18 RX ORDER — PEN NEEDLE, DIABETIC 31 GX5/16"
NEEDLE, DISPOSABLE MISCELLANEOUS
Qty: 100 EACH | Refills: 0 | OUTPATIENT
Start: 2018-06-18

## 2018-07-05 DIAGNOSIS — I10 HYPERTENSION, UNSPECIFIED TYPE: ICD-10-CM

## 2018-07-05 RX ORDER — ATENOLOL 50 MG/1
TABLET ORAL
Qty: 30 TABLET | Refills: 0 | OUTPATIENT
Start: 2018-07-05

## 2018-07-06 DIAGNOSIS — I10 HYPERTENSION, UNSPECIFIED TYPE: ICD-10-CM

## 2018-07-06 RX ORDER — ATENOLOL 50 MG/1
TABLET ORAL
Qty: 30 TABLET | Refills: 0 | OUTPATIENT
Start: 2018-07-06

## 2018-07-09 ENCOUNTER — OFFICE VISIT (OUTPATIENT)
Dept: INTERNAL MEDICINE CLINIC | Facility: CLINIC | Age: 73
End: 2018-07-09
Payer: COMMERCIAL

## 2018-07-09 VITALS
DIASTOLIC BLOOD PRESSURE: 70 MMHG | RESPIRATION RATE: 20 BRPM | TEMPERATURE: 97.6 F | OXYGEN SATURATION: 95 % | BODY MASS INDEX: 38.92 KG/M2 | HEART RATE: 87 BPM | SYSTOLIC BLOOD PRESSURE: 134 MMHG | HEIGHT: 66 IN | WEIGHT: 242.2 LBS

## 2018-07-09 DIAGNOSIS — I10 HYPERTENSION, UNSPECIFIED TYPE: ICD-10-CM

## 2018-07-09 DIAGNOSIS — M54.2 NECK PAIN: Primary | ICD-10-CM

## 2018-07-09 PROCEDURE — 1160F RVW MEDS BY RX/DR IN RCRD: CPT | Performed by: NURSE PRACTITIONER

## 2018-07-09 PROCEDURE — 99213 OFFICE O/P EST LOW 20 MIN: CPT | Performed by: NURSE PRACTITIONER

## 2018-07-09 RX ORDER — ATENOLOL 50 MG/1
50 TABLET ORAL DAILY
Qty: 90 TABLET | Refills: 0 | Status: SHIPPED | OUTPATIENT
Start: 2018-07-09 | End: 2018-10-03 | Stop reason: SDUPTHER

## 2018-07-09 RX ORDER — MELOXICAM 7.5 MG/1
7.5 TABLET ORAL DAILY
Qty: 30 TABLET | Refills: 0 | Status: SHIPPED | OUTPATIENT
Start: 2018-07-09 | End: 2019-04-03

## 2018-07-09 RX ORDER — METHOCARBAMOL 500 MG/1
500 TABLET, FILM COATED ORAL EVERY 8 HOURS SCHEDULED
Qty: 30 TABLET | Refills: 0 | Status: SHIPPED | OUTPATIENT
Start: 2018-07-09 | End: 2019-04-03

## 2018-07-09 NOTE — PROGRESS NOTES
Assessment/Plan:    Neck Pain:  [place] in nature  Likely 2/2 to trip and pt bracing herself prior to fall  Will start meloxicam and methocarbamol  Continue with icy hot and recommend gentle stretching exercises  If no improvement pt to return for follow-up and would recommend PT  Diagnoses and all orders for this visit:    Neck pain  -     methocarbamol (ROBAXIN) 500 mg tablet; Take 1 tablet (500 mg total) by mouth every 8 (eight) hours  -     meloxicam (MOBIC) 7 5 mg tablet; Take 1 tablet (7 5 mg total) by mouth daily        Subjective:      Patient ID: Pippa Baer is a 67 y o  female  Neck Pain    This is a new problem  Episode onset: 1 5 weeks  The problem occurs constantly  The problem has been unchanged  The pain is associated with a twisting injury  The pain is present in the right side  The quality of the pain is described as aching  The pain is at a severity of 9/10  The symptoms are aggravated by position and twisting  The pain is worse during the day  Pertinent negatives include no chest pain, fever, headaches, numbness, paresis, syncope or weakness  She has tried acetaminophen (icy hot) for the symptoms  The treatment provided no relief  Pt reports her symptoms started after she tripped over a rug 1 5 weeks ago  She did not fall but braced her self with her L arm  No CP/SOB, dizziness, lightheadedness, syncope or near-syncope prior to trip  The following portions of the patient's history were reviewed and updated as appropriate: allergies, current medications, past family history, past medical history, past social history, past surgical history and problem list     Review of Systems   Constitutional: Negative for chills, fatigue and fever  Eyes: Negative for visual disturbance  Respiratory: Negative for cough, shortness of breath and wheezing  Cardiovascular: Negative for chest pain, palpitations and syncope  Gastrointestinal: Negative for nausea and vomiting  Musculoskeletal: Positive for neck pain and neck stiffness  Negative for arthralgias, back pain, gait problem, joint swelling and myalgias  Skin: Negative for rash  Neurological: Negative for dizziness, weakness, light-headedness, numbness and headaches           Past Medical History:   Diagnosis Date    Abscess of skin and subcutaneous tissue     last assessed: 1/24/2014    Anxiety     Benign neoplasm of skin     last assessed: 12/3/2013    Benign neoplasm of skin of upper limb, including shoulder     last assessed: 12/3/2013    Chronic low back pain     last assessed: 12/3/2013    Depression     Diabetes mellitus ([facility] )     Eczema     last assessed: 11/14/2014    Hypertension     Mental status change     last assessed: 12/3/2013    Restless leg syndrome     last assessed: 12/3/2013    Type 2 diabetes mellitus ([facility] )     last assessed: 11/13/2013         Current Outpatient Prescriptions:     acetaminophen (TYLENOL) 325 mg tablet, Take 325 mg by mouth as needed  , Disp: , Rfl:     amLODIPine (NORVASC) 5 mg tablet, Take 1 tablet (5 mg total) by mouth daily, Disp: 90 tablet, Rfl: 1    aspirin 81 MG tablet, Take 1 tablet by mouth daily  , Disp: , Rfl:     atenolol (TENORMIN) 25 mg tablet, Take 1 tablet (25 mg total) by mouth daily, Disp: 90 tablet, Rfl: 1    atenolol (TENORMIN) 50 mg tablet, Take 1 tablet (50 mg total) by mouth daily, Disp: 30 tablet, Rfl: 0    ergocalciferol (VITAMIN D2) 50,000 units, Take 1 capsule (50,000 Units total) by mouth once a week, Disp: 8 capsule, Rfl: 0    escitalopram (LEXAPRO) 10 mg tablet, Take 1 tablet (10 mg total) by mouth daily, Disp: 90 tablet, Rfl: 1    hydrochlorothiazide (HYDRODIURIL) 25 mg tablet, Take 1 tablet (25 mg total) by mouth daily, Disp: 90 tablet, Rfl: 1    insulin detemir (LEVEMIR FLEXTOUCH) 100 Units/mL injection pen, Inject 34 Units under the skin every morning, Disp: 5 pen, Rfl: 5    Insulin Pen Needle (NOVOFINE PLUS) 32G X 4 MM MISC, Use 1 time daily with insulin pen, Disp: 90 each, Rfl: 0    metFORMIN (GLUCOPHAGE) 1000 MG tablet, Take 1 tablet (1,000 mg total) by mouth 2 (two) times a day, Disp: 180 tablet, Rfl: 1    nystatin (MYCOSTATIN) powder, Apply topically as needed  , Disp: , Rfl:     simvastatin (ZOCOR) 40 mg tablet, Take 1 tablet (40 mg total) by mouth daily, Disp: 90 tablet, Rfl: 1    Allergies   Allergen Reactions    Cefdinir      Category: Allergy;     Penicillins     Lisinopril Rash     Category: Allergy; Category: Allergy; Annotation - [id number]: ALL FORMS       Social History   Past Surgical History:   Procedure Laterality Date    APPENDECTOMY      CHOLECYSTECTOMY      EAR SURGERY Left     HERNIA REPAIR      ROTATOR CUFF REPAIR Right     done by Dr John Gilliam       Family History   Problem Relation Age of Onset    Adopted: Yes    No Known Problems Mother     No Known Problems Father        Objective:  /70 (BP Location: Right arm, Patient Position: Sitting, Cuff Size: Large)   Pulse 87   Temp 97 6 °F (36 4 °C) (Oral)   Resp 20   Ht 5' 6" (1 676 m)   Wt 110 kg (242 lb 3 2 oz)   SpO2 95% Comment: room air  BMI 39 09 kg/m²      Physical Exam   Constitutional: She is oriented to person, place, and time  She appears well-developed and well-nourished  No distress  Cardiovascular: Normal rate and regular rhythm  Pulmonary/Chest: Effort normal and breath sounds normal  No respiratory distress  She has no wheezes  Musculoskeletal:        Cervical back: She exhibits decreased range of motion (limited lateral rotation to L and R to approx 45 degrees  )  She exhibits no tenderness (improved pain with palpation to trapizius R and L), no swelling, no edema and no spasm  UE 5/5 B/L   Neurological: She is alert and oriented to person, place, and time  Skin: Skin is warm and dry  Psychiatric: She has a normal mood and affect   Her behavior is normal  Judgment and thought content normal    Nursing note and vitals reviewed

## 2018-07-09 NOTE — PATIENT INSTRUCTIONS
Will start meloxicam and methocarbamol  Continue with icy hot and recommend gentle stretching exercises  If no improvement return for follow-up

## 2018-07-26 ENCOUNTER — TELEPHONE (OUTPATIENT)
Dept: INTERNAL MEDICINE CLINIC | Facility: CLINIC | Age: 73
End: 2018-07-26

## 2018-07-26 NOTE — TELEPHONE ENCOUNTER
Spoke to patient and she has questions regarding the possible side effects of her water pills  She states that she is sweating profusely and is thinking her dosage is too high  She refused an appointment considering she feels it is the pill and looking to see if that is possible and to have it adjusted

## 2018-07-27 NOTE — TELEPHONE ENCOUNTER
Please advise the patient that this should not be a side effect of the hydrochlorothiazide  The dose change was made almost 2 months ago  I would recommend her to come in and discuss symptoms   Thanks, Azalea Meredith

## 2018-07-27 NOTE — TELEPHONE ENCOUNTER
I spoke to the patient in regards to the message that Dustin Chamberlain had sent back to the pool  The patient will call back to schedule an appointment when she can

## 2018-07-31 DIAGNOSIS — E55.9 VITAMIN D DEFICIENCY: ICD-10-CM

## 2018-07-31 RX ORDER — ERGOCALCIFEROL 1.25 MG/1
50000 CAPSULE ORAL WEEKLY
Qty: 8 CAPSULE | Refills: 0 | OUTPATIENT
Start: 2018-07-31

## 2018-08-17 DIAGNOSIS — Z79.4 TYPE 2 DIABETES MELLITUS TREATED WITH INSULIN (HCC): ICD-10-CM

## 2018-08-17 DIAGNOSIS — E11.9 TYPE 2 DIABETES MELLITUS TREATED WITH INSULIN (HCC): ICD-10-CM

## 2018-10-01 ENCOUNTER — TRANSCRIBE ORDERS (OUTPATIENT)
Dept: LAB | Facility: CLINIC | Age: 73
End: 2018-10-01

## 2018-10-01 ENCOUNTER — APPOINTMENT (OUTPATIENT)
Dept: LAB | Facility: CLINIC | Age: 73
End: 2018-10-01
Payer: COMMERCIAL

## 2018-10-01 DIAGNOSIS — E11.9 DIABETES MELLITUS TYPE 2, INSULIN DEPENDENT (HCC): ICD-10-CM

## 2018-10-01 DIAGNOSIS — Z79.4 DIABETES MELLITUS TYPE 2, INSULIN DEPENDENT (HCC): Primary | ICD-10-CM

## 2018-10-01 DIAGNOSIS — E55.9 VITAMIN D DEFICIENCY: ICD-10-CM

## 2018-10-01 DIAGNOSIS — E78.2 MIXED HYPERLIPIDEMIA: ICD-10-CM

## 2018-10-01 DIAGNOSIS — E11.9 DIABETES MELLITUS TYPE 2, INSULIN DEPENDENT (HCC): Primary | ICD-10-CM

## 2018-10-01 DIAGNOSIS — Z79.4 DIABETES MELLITUS TYPE 2, INSULIN DEPENDENT (HCC): ICD-10-CM

## 2018-10-01 LAB
25(OH)D3 SERPL-MCNC: 26.9 NG/ML (ref 30–100)
ALBUMIN SERPL BCP-MCNC: 3.6 G/DL (ref 3.5–5)
ALP SERPL-CCNC: 38 U/L (ref 46–116)
ALT SERPL W P-5'-P-CCNC: 24 U/L (ref 12–78)
ANION GAP SERPL CALCULATED.3IONS-SCNC: 7 MMOL/L (ref 4–13)
AST SERPL W P-5'-P-CCNC: 22 U/L (ref 5–45)
BASOPHILS # BLD AUTO: 0.04 THOUSANDS/ΜL (ref 0–0.1)
BASOPHILS NFR BLD AUTO: 1 % (ref 0–1)
BILIRUB SERPL-MCNC: 1.37 MG/DL (ref 0.2–1)
BUN SERPL-MCNC: 13 MG/DL (ref 5–25)
CALCIUM SERPL-MCNC: 9.3 MG/DL (ref 8.3–10.1)
CHLORIDE SERPL-SCNC: 103 MMOL/L (ref 100–108)
CHOLEST SERPL-MCNC: 68 MG/DL (ref 50–200)
CK MB SERPL-MCNC: 1.4 % (ref 0–2.5)
CK MB SERPL-MCNC: 2.3 NG/ML (ref 0–5)
CK SERPL-CCNC: 166 U/L (ref 26–192)
CO2 SERPL-SCNC: 31 MMOL/L (ref 21–32)
CREAT SERPL-MCNC: 0.73 MG/DL (ref 0.6–1.3)
EOSINOPHIL # BLD AUTO: 0.14 THOUSAND/ΜL (ref 0–0.61)
EOSINOPHIL NFR BLD AUTO: 2 % (ref 0–6)
ERYTHROCYTE [DISTWIDTH] IN BLOOD BY AUTOMATED COUNT: 13 % (ref 11.6–15.1)
EST. AVERAGE GLUCOSE BLD GHB EST-MCNC: 166 MG/DL
GFR SERPL CREATININE-BSD FRML MDRD: 83 ML/MIN/1.73SQ M
GLUCOSE P FAST SERPL-MCNC: 146 MG/DL (ref 65–99)
HBA1C MFR BLD: 7.4 % (ref 4.2–6.3)
HCT VFR BLD AUTO: 47.8 % (ref 34.8–46.1)
HDLC SERPL-MCNC: 37 MG/DL (ref 40–60)
HGB BLD-MCNC: 15.5 G/DL (ref 11.5–15.4)
IMM GRANULOCYTES # BLD AUTO: 0.02 THOUSAND/UL (ref 0–0.2)
IMM GRANULOCYTES NFR BLD AUTO: 0 % (ref 0–2)
LDLC SERPL CALC-MCNC: 5 MG/DL (ref 0–100)
LYMPHOCYTES # BLD AUTO: 1.85 THOUSANDS/ΜL (ref 0.6–4.47)
LYMPHOCYTES NFR BLD AUTO: 26 % (ref 14–44)
MCH RBC QN AUTO: 30.2 PG (ref 26.8–34.3)
MCHC RBC AUTO-ENTMCNC: 32.4 G/DL (ref 31.4–37.4)
MCV RBC AUTO: 93 FL (ref 82–98)
MONOCYTES # BLD AUTO: 0.94 THOUSAND/ΜL (ref 0.17–1.22)
MONOCYTES NFR BLD AUTO: 13 % (ref 4–12)
NEUTROPHILS # BLD AUTO: 4.23 THOUSANDS/ΜL (ref 1.85–7.62)
NEUTS SEG NFR BLD AUTO: 58 % (ref 43–75)
NONHDLC SERPL-MCNC: 31 MG/DL
NRBC BLD AUTO-RTO: 0 /100 WBCS
PLATELET # BLD AUTO: 213 THOUSANDS/UL (ref 149–390)
PMV BLD AUTO: 11.4 FL (ref 8.9–12.7)
POTASSIUM SERPL-SCNC: 3.6 MMOL/L (ref 3.5–5.3)
PROT SERPL-MCNC: 7.3 G/DL (ref 6.4–8.2)
RBC # BLD AUTO: 5.13 MILLION/UL (ref 3.81–5.12)
SODIUM SERPL-SCNC: 141 MMOL/L (ref 136–145)
TRIGL SERPL-MCNC: 130 MG/DL
WBC # BLD AUTO: 7.22 THOUSAND/UL (ref 4.31–10.16)

## 2018-10-01 PROCEDURE — 82306 VITAMIN D 25 HYDROXY: CPT

## 2018-10-01 PROCEDURE — 83036 HEMOGLOBIN GLYCOSYLATED A1C: CPT

## 2018-10-01 PROCEDURE — 82553 CREATINE MB FRACTION: CPT

## 2018-10-01 PROCEDURE — 82550 ASSAY OF CK (CPK): CPT

## 2018-10-01 PROCEDURE — 36415 COLL VENOUS BLD VENIPUNCTURE: CPT

## 2018-10-01 PROCEDURE — 80061 LIPID PANEL: CPT

## 2018-10-01 PROCEDURE — 80053 COMPREHEN METABOLIC PANEL: CPT

## 2018-10-01 PROCEDURE — 85025 COMPLETE CBC W/AUTO DIFF WBC: CPT

## 2018-10-02 DIAGNOSIS — I10 HYPERTENSION, UNSPECIFIED TYPE: ICD-10-CM

## 2018-10-02 RX ORDER — ATENOLOL 50 MG/1
TABLET ORAL
Qty: 90 TABLET | Refills: 0 | Status: CANCELLED | OUTPATIENT
Start: 2018-10-02

## 2018-10-03 DIAGNOSIS — I10 HYPERTENSION, UNSPECIFIED TYPE: ICD-10-CM

## 2018-10-03 RX ORDER — ATENOLOL 50 MG/1
50 TABLET ORAL DAILY
Qty: 90 TABLET | Refills: 1 | Status: SHIPPED | OUTPATIENT
Start: 2018-10-03 | End: 2019-04-08 | Stop reason: SDUPTHER

## 2018-10-17 ENCOUNTER — OFFICE VISIT (OUTPATIENT)
Dept: INTERNAL MEDICINE CLINIC | Facility: CLINIC | Age: 73
End: 2018-10-17
Payer: COMMERCIAL

## 2018-10-17 VITALS
BODY MASS INDEX: 36.59 KG/M2 | DIASTOLIC BLOOD PRESSURE: 84 MMHG | WEIGHT: 241.4 LBS | HEIGHT: 68 IN | OXYGEN SATURATION: 98 % | SYSTOLIC BLOOD PRESSURE: 122 MMHG | TEMPERATURE: 98.4 F | HEART RATE: 86 BPM

## 2018-10-17 DIAGNOSIS — Z12.11 SCREENING FOR COLON CANCER: Primary | ICD-10-CM

## 2018-10-17 DIAGNOSIS — E78.2 MIXED HYPERLIPIDEMIA: ICD-10-CM

## 2018-10-17 DIAGNOSIS — Z79.4 DIABETES MELLITUS TYPE 2, INSULIN DEPENDENT (HCC): ICD-10-CM

## 2018-10-17 DIAGNOSIS — Z23 NEED FOR INFLUENZA VACCINATION: ICD-10-CM

## 2018-10-17 DIAGNOSIS — F41.8 DEPRESSION WITH ANXIETY: ICD-10-CM

## 2018-10-17 DIAGNOSIS — I10 BENIGN ESSENTIAL HYPERTENSION: ICD-10-CM

## 2018-10-17 DIAGNOSIS — E55.9 VITAMIN D DEFICIENCY: ICD-10-CM

## 2018-10-17 DIAGNOSIS — E11.9 DIABETES MELLITUS TYPE 2, INSULIN DEPENDENT (HCC): ICD-10-CM

## 2018-10-17 PROCEDURE — 3074F SYST BP LT 130 MM HG: CPT | Performed by: NURSE PRACTITIONER

## 2018-10-17 PROCEDURE — 1036F TOBACCO NON-USER: CPT | Performed by: NURSE PRACTITIONER

## 2018-10-17 PROCEDURE — 3008F BODY MASS INDEX DOCD: CPT | Performed by: NURSE PRACTITIONER

## 2018-10-17 PROCEDURE — 90662 IIV NO PRSV INCREASED AG IM: CPT

## 2018-10-17 PROCEDURE — 3079F DIAST BP 80-89 MM HG: CPT | Performed by: NURSE PRACTITIONER

## 2018-10-17 PROCEDURE — 90471 IMMUNIZATION ADMIN: CPT

## 2018-10-17 PROCEDURE — 99214 OFFICE O/P EST MOD 30 MIN: CPT | Performed by: NURSE PRACTITIONER

## 2018-10-17 NOTE — ASSESSMENT & PLAN NOTE
Lab Results   Component Value Date    HGBA1C 7 4 (H) 10/01/2018    patient is to continue to take metformin 1000 twice a day, and will continue with Levemir at 34 units  Patient's hemoglobin A1c did increase from 7 1-7 4 however patient's blood sugar readings are between 100 to 140s and  Feel that increasing patient's Levemir may cause hypoglycemic episodes  Will plan on checking a follow-up hemoglobin A1c in 4 months  Patient is to continue to work on diet and exercise  Limit sugars and carbohydrate intake  Avoid soda, juice, sweets, cookies, desserts, pasta, bread    Eat more whole grains, exercised 30 min of cardio at least 3 times a week  Also recommended daily foot exams to check for sores, and recommended yearly eye exams

## 2018-10-17 NOTE — ASSESSMENT & PLAN NOTE
Patient's vitamin-D level 26 9, will advise patient to continue to take vitamin-D and calcium supplementation

## 2018-10-17 NOTE — PROGRESS NOTES
Assessment/Plan:    Diabetes mellitus type 2, insulin dependent ([facility] [address] )  Lab Results   Component Value Date    HGBA1C 7 4 (H) 10/01/2018    patient is to continue to take metformin 1000 twice a day, and will continue with Levemir at 34 units  Patient's hemoglobin A1c did increase from 7 1-7 4 however patient's blood sugar readings are between 100 to 140s and  Feel that increasing patient's Levemir may cause hypoglycemic episodes  Will plan on checking a follow-up hemoglobin A1c in 4 months  Patient is to continue to work on diet and exercise  Limit sugars and carbohydrate intake  Avoid soda, juice, sweets, cookies, desserts, pasta, bread    Eat more whole grains, exercised 30 min of cardio at least 3 times a week  Also recommended daily foot exams to check for sores, and recommended yearly eye exams  Benign essential hypertension   Patient's blood pressure currently well controlled on hydrochlorothiazide Norvasc and atenolol  Continue current regimen -   Continue to monitor blood pressure at home  Goal BP is < 130/80  Contact our office for consistent elevations  Recommend low sodium diet  Exercise 30 minutes three times a week as tolerated  Recommend yearly eye exam       Depression with anxiety    Will continue with Lexapro 10 mg, will reach out to [address] [place] [address] to see if patient has options to get involved with other community activities  Mixed hyperlipidemia    Patient is well controlled on simvastatin, CK level within normal limits  Recommend healthy lifestyle choices for your cholesterol  Low fat/low cholesterol diet  Limit/avoid red meat  Eat more lean meat - chicken breast, ground turkey, fish  Exercise 30 mins at least 3 times a week as tolerated  Vitamin D deficiency    Patient's vitamin-D level 26 9, will advise patient to continue to take vitamin-D and calcium supplementation         Diagnoses and all orders for this visit:    Screening for colon cancer  -     Ambulatory referral to Gastroenterology; Future    Mixed hyperlipidemia  -     Lipid panel    Benign essential hypertension  -     Comprehensive metabolic panel; Future  -     CBC and differential    Diabetes mellitus type 2, insulin dependent (HCC)  -     Hemoglobin A1C    Need for influenza vaccination  -     influenza vaccine, [id number], high-dose, PF 0 5 mL, for patients 65 yr+ (FLUZONE HIGH-DOSE)    Depression with anxiety    Vitamin D deficiency          Subjective:      Patient ID: Danica Mcconnell is a 68 y o  female  Patient presents today to follow-up on hypertension, diabetes, depression /anxiety, and hyperlipidemia  Patient states that she feels very tired on board lately, and has been feeling lonely  Patient states she feels well controlled on her current depression medication  Patient also would like to get her flu vaccination while in the office today  Flu vaccination: given in the office today    DM- fasting 140-150  Before snack at night 130-140      Hypertension   This is a chronic problem  The current episode started more than 1 year ago  The problem is unchanged  The problem is controlled  Associated symptoms include anxiety  Pertinent negatives include no blurred vision, chest pain, palpitations or shortness of breath  Risk factors for coronary artery disease include obesity, dyslipidemia, diabetes mellitus and sedentary lifestyle  Treatments tried:  patient currently taking amlodipine atenolol and hydrochlorothiazide  The current treatment provides moderate improvement  Compliance problems include diet and exercise  Diabetes   She presents for her follow-up diabetic visit  She has type 2 diabetes mellitus  Her disease course has been fluctuating ( patient's current hemoglobin A1c 7 4)  Pertinent negatives for hypoglycemia include no dizziness or nervousness/anxiousness   Pertinent negatives for diabetes include no blurred vision, no chest pain, no foot ulcerations, no polydipsia, no polyphagia and no polyuria  There are no hypoglycemic complications  Symptoms are worsening  Risk factors for coronary artery disease include diabetes mellitus, dyslipidemia, hypertension, obesity and sedentary lifestyle  Current diabetic treatments:  patient currently taking Levemir and metformin  She is compliant with treatment all of the time  Her weight is stable  She is following a generally unhealthy diet  ( Patient's fasting blood sugars range between 140-150 patient's blood sugar before bedtime 130-140)   Anxiety   Presents for follow-up visit  Patient reports no chest pain, decreased concentration, depressed mood, dizziness, excessive worry, nervous/anxious behavior, palpitations, panic, restlessness or shortness of breath  Primary symptoms comment:  patient states he feels   Symptoms occur occasionally  The severity of symptoms is mild  The quality of sleep is good  Nighttime awakenings: occasional        Hyperlipidemia   This is a chronic problem  The current episode started more than 1 year ago  The problem is controlled  Recent lipid tests were reviewed and are normal  Exacerbating diseases include diabetes and obesity  Pertinent negatives include no chest pain, focal weakness, leg pain or shortness of breath  Treatments tried:   Patient currently well controlled on simvastatin, CK level within normal limits  The current treatment provides significant improvement of lipids  Compliance problems include adherence to diet and adherence to exercise  The following portions of the patient's history were reviewed and updated as appropriate: allergies, current medications, past family history, past medical history, past social history, past surgical history and problem list     Review of Systems   Constitutional: Negative for activity change and appetite change  HENT: Negative for ear discharge, ear pain, postnasal drip, rhinorrhea, sinus pain and sinus pressure      Eyes: Negative for blurred vision, pain, discharge, itching and visual disturbance  Respiratory: Negative for chest tightness, shortness of breath and wheezing  Cardiovascular: Negative for chest pain, palpitations and leg swelling  Gastrointestinal: Negative for constipation and diarrhea  Endocrine: Negative for polydipsia, polyphagia and polyuria  Genitourinary: Negative for difficulty urinating, dysuria, hematuria and urgency  Musculoskeletal: Negative for back pain  Skin: Negative for wound  Neurological: Negative for dizziness and focal weakness  Psychiatric/Behavioral: Negative for decreased concentration  The patient is not nervous/anxious            Patient states she feels lonely         Past Medical History:   Diagnosis Date    Abscess of skin and subcutaneous tissue     last assessed: 1/24/2014    Anxiety     Benign neoplasm of skin     last assessed: 12/3/2013    Benign neoplasm of skin of upper limb, including shoulder     last assessed: 12/3/2013    Chronic low back pain     last assessed: 12/3/2013    Depression     [facility] ([facility] )     Eczema     last assessed: 11/14/2014    Hypertension     Mental status change     last assessed: 12/3/2013    Restless leg syndrome     last assessed: 12/3/2013    Type 2 diabetes mellitus ([facility] )     last assessed: 11/13/2013         Current Outpatient Prescriptions:     acetaminophen (TYLENOL) 325 mg tablet, Take 325 mg by mouth as needed  , Disp: , Rfl:     amLODIPine (NORVASC) 5 mg tablet, Take 1 tablet (5 mg total) by mouth daily, Disp: 90 tablet, Rfl: 1    aspirin 81 MG tablet, Take 1 tablet by mouth daily  , Disp: , Rfl:     atenolol (TENORMIN) 25 mg tablet, Take 1 tablet (25 mg total) by mouth daily, Disp: 90 tablet, Rfl: 1    atenolol (TENORMIN) 50 mg tablet, Take 1 tablet (50 mg total) by mouth daily (takes along with 25 mg), Disp: 90 tablet, Rfl: 1    escitalopram (LEXAPRO) 10 mg tablet, Take 1 tablet (10 mg total) by mouth daily, Disp: 90 tablet, Rfl: 1    hydrochlorothiazide (HYDRODIURIL) 25 mg tablet, Take 1 tablet (25 mg total) by mouth daily, Disp: 90 tablet, Rfl: 1    insulin detemir (LEVEMIR FLEXTOUCH) 100 Units/mL injection pen, Inject 34 Units under the skin every morning, Disp: 5 pen, Rfl: 5    Insulin Pen Needle (NOVOFINE PLUS) 32G X 4 MM MISC, Use 1 time daily with insulin pen, Disp: 90 each, Rfl: 0    metFORMIN (GLUCOPHAGE) 1000 MG tablet, Take 1 tablet (1,000 mg total) by mouth 2 (two) times a day, Disp: 180 tablet, Rfl: 1    nystatin (MYCOSTATIN) powder, Apply topically as needed  , Disp: , Rfl:     simvastatin (ZOCOR) 40 mg tablet, Take 1 tablet (40 mg total) by mouth daily, Disp: 90 tablet, Rfl: 1    ergocalciferol (VITAMIN D2) 50,000 units, Take 1 capsule (50,000 Units total) by mouth once a week (Patient not taking: Reported on 10/17/2018 ), Disp: 8 capsule, Rfl: 0    meloxicam (MOBIC) 7 5 mg tablet, Take 1 tablet (7 5 mg total) by mouth daily (Patient not taking: Reported on 10/17/2018 ), Disp: 30 tablet, Rfl: 0    methocarbamol (ROBAXIN) 500 mg tablet, Take 1 tablet (500 mg total) by mouth every 8 (eight) hours (Patient not taking: Reported on 10/17/2018 ), Disp: 30 tablet, Rfl: 0    Allergies   Allergen Reactions    Cefdinir      Category: Allergy;     Penicillins     Lisinopril Rash     Category: Allergy; Category: Allergy; [facility] - [id number]: ALL FORMS       Social History   Past Surgical History:   Procedure Laterality Date    APPENDECTOMY      CHOLECYSTECTOMY      EAR SURGERY Left     HERNIA REPAIR      ROTATOR CUFF REPAIR Right     done by Dr Montoya Gave       Family History   Problem Relation Age of Onset    Adopted:  Yes    No Known Problems Mother     No Known Problems Father        Objective:  /84 (BP Location: Left arm, Patient Position: Sitting, Cuff Size: Large)   Pulse 86   Temp 98 4 °F (36 9 °C) (Oral)   Ht 5' 7 5" (1 715 m)   Wt 109 kg (241 lb 6 4 oz)   SpO2 98% Comment: room air  BMI 37 25 kg/m²     Recent Results (from the past 1344 hour(s))   CBC and differential    Collection Time: 10/01/18  8:02 AM   Result Value Ref Range    WBC 7 22 4 31 - 10 16 Thousand/uL    RBC 5 13 (H) 3 81 - 5 12 Million/uL    Hemoglobin 15 5 (H) 11 5 - 15 4 g/dL    Hematocrit 47 8 (H) 34 8 - 46 1 %    MCV 93 82 - 98 fL    MCH 30 2 26 8 - 34 3 pg    MCHC 32 4 31 4 - 37 4 g/dL    RDW 13 0 11 6 - 15 1 %    MPV 11 4 8 9 - 12 7 fL    Platelets 489 584 - 213 Thousands/uL    nRBC 0 /100 WBCs    Neutrophils Relative 58 43 - 75 %    Immat GRANS % 0 0 - 2 %    Lymphocytes Relative 26 14 - 44 %    Monocytes Relative 13 (H) 4 - 12 %    Eosinophils Relative 2 0 - 6 %    Basophils Relative 1 0 - 1 %    Neutrophils Absolute 4 23 1 85 - 7 62 Thousands/µL    Immature Grans Absolute 0 02 0 00 - 0 20 Thousand/uL    Lymphocytes Absolute 1 85 0 60 - 4 47 Thousands/µL    Monocytes Absolute 0 94 0 17 - 1 22 Thousand/µL    Eosinophils Absolute 0 14 0 00 - 0 61 Thousand/µL    Basophils Absolute 0 04 0 00 - 0 10 Thousands/µL   Vitamin D 25 hydroxy    Collection Time: 10/01/18  8:02 AM   Result Value Ref Range    Vit D, 25-Hydroxy 26 9 (L) 30 0 - 100 0 ng/mL   Hemoglobin A1C    Collection Time: 10/01/18  8:02 AM   Result Value Ref Range    Hemoglobin A1C 7 4 (H) 4 2 - 6 3 %     mg/dl   Comprehensive metabolic panel    Collection Time: 10/01/18  8:02 AM   Result Value Ref Range    Sodium 141 136 - 145 mmol/L    Potassium 3 6 3 5 - 5 3 mmol/L    Chloride 103 100 - 108 mmol/L    CO2 31 21 - 32 mmol/L    ANION GAP 7 4 - 13 mmol/L    BUN 13 5 - 25 mg/dL    Creatinine 0 73 0 60 - 1 30 mg/dL    Glucose, Fasting 146 (H) 65 - 99 mg/dL    Calcium 9 3 8 3 - 10 1 mg/dL    AST 22 5 - 45 U/L    ALT 24 12 - 78 U/L    Alkaline Phosphatase 38 (L) 46 - 116 U/L    Total Protein 7 3 6 4 - 8 2 g/dL    Albumin 3 6 3 5 - 5 0 g/dL    Total Bilirubin 1 37 (H) 0 20 - 1 00 mg/dL    eGFR 83 ml/min/1 73sq m Lipid panel    Collection Time: 10/01/18  8:02 AM   Result Value Ref Range    Cholesterol 68 50 - 200 mg/dL    Triglycerides 130 <=150 mg/dL    HDL, Direct 37 (L) 40 - 60 mg/dL    LDL Calculated 5 0 - 100 mg/dL    Non-HDL-Chol (CHOL-HDL) 31 mg/dl   CK (with reflex to MB)    Collection Time: 10/01/18  8:02 AM   Result Value Ref Range    Total  26 - 192 U/L   CKMB    Collection Time: 10/01/18  8:02 AM   Result Value Ref Range    CK-MB Index 1 4 0 0 - 2 5 %    CK-MB FRACTION 2 3 0 0 - 5 0 ng/mL            Physical Exam   Constitutional: She is oriented to person, place, and time  She appears well-developed and well-nourished  obese   HENT:   Head: Normocephalic and atraumatic  Eyes: Pupils are equal, round, and reactive to light  Conjunctivae and EOM are normal    Neck: Normal range of motion  Neck supple  No JVD present  No thyromegaly present  Cardiovascular: Normal rate, normal heart sounds and intact distal pulses  Exam reveals no gallop  No murmur heard  Pulmonary/Chest: Effort normal and breath sounds normal  No respiratory distress  She has no wheezes  She has no rales  She exhibits no tenderness  Abdominal: Soft  Bowel sounds are normal  She exhibits no distension  There is no tenderness  Musculoskeletal: Normal range of motion  She exhibits edema (+1 B/L edema)  She exhibits no tenderness or deformity  Lymphadenopathy:     She has no cervical adenopathy  Neurological: She is alert and oriented to person, place, and time  No cranial nerve deficit  Skin: Skin is warm and dry  No rash noted  No erythema  No pallor  Psychiatric: She has a normal mood and affect  Her behavior is normal  Judgment and thought content normal    Nursing note and vitals reviewed

## 2018-10-17 NOTE — ASSESSMENT & PLAN NOTE
Will continue with Lexapro 10 mg, will reach out to [address] [place] [address] to see if patient has options to get involved with other community activities

## 2018-10-17 NOTE — ASSESSMENT & PLAN NOTE
Patient's blood pressure currently well controlled on hydrochlorothiazide Norvasc and atenolol  Continue current regimen -   Continue to monitor blood pressure at home  Goal BP is < 130/80  Contact our office for consistent elevations  Recommend low sodium diet  Exercise 30 minutes three times a week as tolerated    Recommend yearly eye exam

## 2018-10-17 NOTE — ASSESSMENT & PLAN NOTE
Patient is well controlled on simvastatin, CK level within normal limits  Recommend healthy lifestyle choices for your cholesterol  Low fat/low cholesterol diet  Limit/avoid red meat  Eat more lean meat - chicken breast, ground turkey, fish  Exercise 30 mins at least 3 times a week as tolerated

## 2018-10-17 NOTE — PROGRESS NOTES
Diabetic Foot Exam    Patient's shoes and socks removed  Right Foot/Ankle   Right Foot Inspection  Skin Exam: skin normal and skin intact no dry skin, no warmth, no callus, no erythema, no maceration, no abnormal color, no pre-ulcer, no ulcer and no callus                          Toe Exam: ROM and strength within normal limits  Sensory       Monofilament testing: intact  Vascular  Capillary refills: < 3 seconds  The right DP pulse is 2+  The right PT pulse is 2+  Left Foot/Ankle  Left Foot Inspection  Skin Exam: skin normal and skin intactno dry skin, no warmth, no erythema, no maceration, normal color, no pre-ulcer, no ulcer and no callus                         Toe Exam: ROM and strength within normal limits                   Sensory       Monofilament: intact  Vascular  Capillary refills: < 3 seconds  The left DP pulse is 2+  The left PT pulse is 2+  Assign Risk Category:  No deformity present;  No loss of protective sensation;        Risk: 0

## 2018-11-01 ENCOUNTER — PATIENT OUTREACH (OUTPATIENT)
Dept: INTERNAL MEDICINE CLINIC | Facility: CLINIC | Age: 73
End: 2018-11-01

## 2018-11-12 DIAGNOSIS — Z79.4 TYPE 2 DIABETES MELLITUS TREATED WITH INSULIN (HCC): ICD-10-CM

## 2018-11-12 DIAGNOSIS — E11.9 TYPE 2 DIABETES MELLITUS TREATED WITH INSULIN (HCC): ICD-10-CM

## 2018-11-30 DIAGNOSIS — I10 HYPERTENSION, UNSPECIFIED TYPE: ICD-10-CM

## 2018-11-30 DIAGNOSIS — E11.9 DIABETES MELLITUS TYPE 2, INSULIN DEPENDENT (HCC): ICD-10-CM

## 2018-11-30 DIAGNOSIS — F41.8 DEPRESSION WITH ANXIETY: ICD-10-CM

## 2018-11-30 DIAGNOSIS — Z79.4 DIABETES MELLITUS TYPE 2, INSULIN DEPENDENT (HCC): ICD-10-CM

## 2018-11-30 RX ORDER — HYDROCHLOROTHIAZIDE 25 MG/1
25 TABLET ORAL DAILY
Qty: 90 TABLET | Refills: 1 | Status: SHIPPED | OUTPATIENT
Start: 2018-11-30 | End: 2019-04-03

## 2018-12-03 DIAGNOSIS — I10 HYPERTENSION, UNSPECIFIED TYPE: ICD-10-CM

## 2018-12-03 RX ORDER — ESCITALOPRAM OXALATE 10 MG/1
TABLET ORAL
Qty: 90 TABLET | Refills: 0 | OUTPATIENT
Start: 2018-12-03

## 2018-12-03 RX ORDER — AMLODIPINE BESYLATE 5 MG/1
TABLET ORAL
Qty: 90 TABLET | Refills: 0 | OUTPATIENT
Start: 2018-12-03

## 2018-12-04 RX ORDER — AMLODIPINE BESYLATE 5 MG/1
5 TABLET ORAL DAILY
Qty: 90 TABLET | Refills: 1 | Status: SHIPPED | OUTPATIENT
Start: 2018-12-04 | End: 2019-04-26

## 2018-12-10 DIAGNOSIS — I10 HYPERTENSION, UNSPECIFIED TYPE: ICD-10-CM

## 2018-12-11 RX ORDER — ATENOLOL 25 MG/1
25 TABLET ORAL DAILY
Qty: 90 TABLET | Refills: 1 | Status: SHIPPED | OUTPATIENT
Start: 2018-12-11 | End: 2018-12-26 | Stop reason: SDUPTHER

## 2018-12-26 DIAGNOSIS — E11.9 DIABETES MELLITUS TYPE 2, INSULIN DEPENDENT (HCC): ICD-10-CM

## 2018-12-26 DIAGNOSIS — Z79.4 DIABETES MELLITUS TYPE 2, INSULIN DEPENDENT (HCC): ICD-10-CM

## 2018-12-26 DIAGNOSIS — F41.8 DEPRESSION WITH ANXIETY: ICD-10-CM

## 2018-12-26 DIAGNOSIS — I10 HYPERTENSION, UNSPECIFIED TYPE: ICD-10-CM

## 2018-12-26 RX ORDER — ATENOLOL 25 MG/1
25 TABLET ORAL DAILY
Qty: 90 TABLET | Refills: 1 | Status: SHIPPED | OUTPATIENT
Start: 2018-12-26 | End: 2019-04-26

## 2018-12-26 RX ORDER — ESCITALOPRAM OXALATE 10 MG/1
10 TABLET ORAL DAILY
Qty: 90 TABLET | Refills: 1 | Status: SHIPPED | OUTPATIENT
Start: 2018-12-26 | End: 2019-07-12 | Stop reason: SDUPTHER

## 2019-01-21 DIAGNOSIS — E78.00 HYPERCHOLESTEROLEMIA: ICD-10-CM

## 2019-01-21 RX ORDER — SIMVASTATIN 40 MG
40 TABLET ORAL DAILY
Qty: 90 TABLET | Refills: 1 | Status: SHIPPED | OUTPATIENT
Start: 2019-01-21

## 2019-04-03 ENCOUNTER — TRANSCRIBE ORDERS (OUTPATIENT)
Dept: ADMINISTRATIVE | Facility: HOSPITAL | Age: 74
End: 2019-04-03

## 2019-04-03 ENCOUNTER — OFFICE VISIT (OUTPATIENT)
Dept: INTERNAL MEDICINE CLINIC | Facility: CLINIC | Age: 74
End: 2019-04-03
Payer: COMMERCIAL

## 2019-04-03 ENCOUNTER — HOSPITAL ENCOUNTER (OUTPATIENT)
Dept: RADIOLOGY | Facility: IMAGING CENTER | Age: 74
Discharge: HOME | End: 2019-04-03
Payer: COMMERCIAL

## 2019-04-03 VITALS
TEMPERATURE: 98.4 F | SYSTOLIC BLOOD PRESSURE: 120 MMHG | HEIGHT: 68 IN | BODY MASS INDEX: 35.25 KG/M2 | WEIGHT: 232.6 LBS | OXYGEN SATURATION: 97 % | DIASTOLIC BLOOD PRESSURE: 64 MMHG | HEART RATE: 85 BPM

## 2019-04-03 DIAGNOSIS — M25.562 ACUTE PAIN OF LEFT KNEE: ICD-10-CM

## 2019-04-03 DIAGNOSIS — Z12.39 SCREENING FOR BREAST CANCER: Primary | ICD-10-CM

## 2019-04-03 PROCEDURE — 99214 OFFICE O/P EST MOD 30 MIN: CPT | Performed by: INTERNAL MEDICINE

## 2019-04-03 PROCEDURE — 36415 COLL VENOUS BLD VENIPUNCTURE: CPT

## 2019-04-03 PROCEDURE — 73560 X-RAY EXAM OF KNEE 1 OR 2: CPT

## 2019-04-03 RX ORDER — COLCHICINE 0.6 MG/1
0.6 TABLET ORAL DAILY
Qty: 30 TABLET | Refills: 0 | Status: SHIPPED | OUTPATIENT
Start: 2019-04-03 | End: 2019-04-20 | Stop reason: SDUPTHER

## 2019-04-03 RX ORDER — MELOXICAM 15 MG/1
15 TABLET ORAL DAILY
Qty: 15 TABLET | Refills: 1 | Status: SHIPPED | OUTPATIENT
Start: 2019-04-03 | End: 2019-04-26

## 2019-04-04 LAB
1,5-ANHYDROGLUCITOL SERPL-MCNC: 20.4 MCG/ML (ref 7.5–28.4)
BUN SERPL-MCNC: 16 MG/DL (ref 7–25)
BUN/CREAT SERPL: ABNORMAL (CALC) (ref 6–22)
CALCIUM SERPL-MCNC: 9.6 MG/DL (ref 8.6–10.4)
CHLORIDE SERPL-SCNC: 98 MMOL/L (ref 98–110)
CO2 SERPL-SCNC: 28 MMOL/L (ref 20–32)
CREAT SERPL-MCNC: 0.69 MG/DL (ref 0.6–0.93)
EST. AVERAGE GLUCOSE BLD GHB EST-MCNC: 160 (CALC)
EST. AVERAGE GLUCOSE BLD GHB EST-SCNC: 8.9 (CALC)
GLUCOSE SERPL-MCNC: 216 MG/DL (ref 65–99)
HBA1C MFR BLD: 7.2 % OF TOTAL HGB
POTASSIUM SERPL-SCNC: 3.6 MMOL/L (ref 3.5–5.3)
SL AMB EGFR AFRICAN AMERICAN: 100 ML/MIN/1.73M2
SL AMB EGFR NON AFRICAN AMERICAN: 86 ML/MIN/1.73M2
SODIUM SERPL-SCNC: 140 MMOL/L (ref 135–146)
URATE SERPL-MCNC: 7.3 MG/DL (ref 2.5–7)

## 2019-04-08 DIAGNOSIS — I10 HYPERTENSION, UNSPECIFIED TYPE: ICD-10-CM

## 2019-04-09 RX ORDER — ATENOLOL 50 MG/1
50 TABLET ORAL DAILY
Qty: 90 TABLET | Refills: 1 | Status: SHIPPED | OUTPATIENT
Start: 2019-04-09 | End: 2019-04-26

## 2019-04-11 ENCOUNTER — TELEPHONE (OUTPATIENT)
Dept: INTERNAL MEDICINE CLINIC | Age: 74
End: 2019-04-11

## 2019-04-12 ENCOUNTER — OFFICE VISIT (OUTPATIENT)
Dept: INTERNAL MEDICINE CLINIC | Facility: CLINIC | Age: 74
End: 2019-04-12
Payer: COMMERCIAL

## 2019-04-12 VITALS
DIASTOLIC BLOOD PRESSURE: 80 MMHG | SYSTOLIC BLOOD PRESSURE: 130 MMHG | TEMPERATURE: 97.9 F | OXYGEN SATURATION: 95 % | HEART RATE: 89 BPM

## 2019-04-12 DIAGNOSIS — M17.12 PRIMARY OSTEOARTHRITIS OF LEFT KNEE: Primary | ICD-10-CM

## 2019-04-12 DIAGNOSIS — F41.8 DEPRESSION WITH ANXIETY: ICD-10-CM

## 2019-04-12 DIAGNOSIS — R06.02 SHORTNESS OF BREATH: ICD-10-CM

## 2019-04-12 DIAGNOSIS — R07.89 CHEST TIGHTNESS OR PRESSURE: ICD-10-CM

## 2019-04-12 DIAGNOSIS — Z79.4 DIABETES MELLITUS TYPE 2, INSULIN DEPENDENT (HCC): ICD-10-CM

## 2019-04-12 DIAGNOSIS — R09.02 HYPOXEMIA: ICD-10-CM

## 2019-04-12 DIAGNOSIS — H74.8X2 HEMATOTYMPANUM OF LEFT EAR: ICD-10-CM

## 2019-04-12 DIAGNOSIS — E11.9 DIABETES MELLITUS TYPE 2, INSULIN DEPENDENT (HCC): ICD-10-CM

## 2019-04-12 DIAGNOSIS — I44.7 LBBB (LEFT BUNDLE BRANCH BLOCK): ICD-10-CM

## 2019-04-12 DIAGNOSIS — I48.19 PERSISTENT ATRIAL FIBRILLATION (HCC): ICD-10-CM

## 2019-04-12 DIAGNOSIS — I10 BENIGN ESSENTIAL HYPERTENSION: ICD-10-CM

## 2019-04-12 DIAGNOSIS — I49.3 PVC (PREMATURE VENTRICULAR CONTRACTION): ICD-10-CM

## 2019-04-12 PROBLEM — B00.1 COLD SORE: Status: RESOLVED | Noted: 2018-01-18 | Resolved: 2019-04-12

## 2019-04-12 PROCEDURE — 99215 OFFICE O/P EST HI 40 MIN: CPT | Performed by: FAMILY MEDICINE

## 2019-04-12 PROCEDURE — 93000 ELECTROCARDIOGRAM COMPLETE: CPT | Performed by: FAMILY MEDICINE

## 2019-04-12 PROCEDURE — 82962 GLUCOSE BLOOD TEST: CPT | Performed by: FAMILY MEDICINE

## 2019-04-19 ENCOUNTER — TRANSITIONAL CARE MANAGEMENT (OUTPATIENT)
Dept: INTERNAL MEDICINE CLINIC | Facility: CLINIC | Age: 74
End: 2019-04-19

## 2019-04-19 RX ORDER — ATORVASTATIN CALCIUM 80 MG/1
80 TABLET, FILM COATED ORAL DAILY
COMMUNITY
Start: 2019-04-18

## 2019-04-19 RX ORDER — METOPROLOL SUCCINATE 100 MG/1
75 TABLET, EXTENDED RELEASE ORAL
COMMUNITY
Start: 2019-04-18

## 2019-04-19 RX ORDER — TORSEMIDE 20 MG/1
20 TABLET ORAL
COMMUNITY
Start: 2019-04-18

## 2019-04-19 RX ORDER — SPIRONOLACTONE 25 MG/1
25 TABLET ORAL DAILY
COMMUNITY
Start: 2019-04-19

## 2019-04-19 RX ORDER — LOSARTAN POTASSIUM 50 MG/1
50 TABLET ORAL DAILY
COMMUNITY
Start: 2019-04-19

## 2019-04-19 RX ORDER — POTASSIUM CHLORIDE 20 MEQ/1
20 TABLET, EXTENDED RELEASE ORAL DAILY
COMMUNITY
Start: 2019-04-18

## 2019-04-20 DIAGNOSIS — M25.562 ACUTE PAIN OF LEFT KNEE: ICD-10-CM

## 2019-04-22 RX ORDER — COLCHICINE 0.6 MG/1
TABLET ORAL
Qty: 30 TABLET | Refills: 0 | Status: SHIPPED | OUTPATIENT
Start: 2019-04-22 | End: 2019-04-26

## 2019-04-26 ENCOUNTER — APPOINTMENT (OUTPATIENT)
Dept: LAB | Age: 74
End: 2019-04-26
Payer: COMMERCIAL

## 2019-04-26 ENCOUNTER — TRANSCRIBE ORDERS (OUTPATIENT)
Dept: LAB | Age: 74
End: 2019-04-26

## 2019-04-26 ENCOUNTER — OFFICE VISIT (OUTPATIENT)
Dept: INTERNAL MEDICINE CLINIC | Age: 74
End: 2019-04-26
Payer: COMMERCIAL

## 2019-04-26 VITALS
SYSTOLIC BLOOD PRESSURE: 104 MMHG | WEIGHT: 221.4 LBS | BODY MASS INDEX: 34.16 KG/M2 | HEART RATE: 74 BPM | TEMPERATURE: 98.9 F | OXYGEN SATURATION: 97 % | DIASTOLIC BLOOD PRESSURE: 58 MMHG

## 2019-04-26 DIAGNOSIS — R91.8 MULTIPLE PULMONARY NODULES: ICD-10-CM

## 2019-04-26 DIAGNOSIS — Z12.11 SCREENING FOR COLON CANCER: Primary | ICD-10-CM

## 2019-04-26 DIAGNOSIS — I48.19 PERSISTENT ATRIAL FIBRILLATION (HCC): ICD-10-CM

## 2019-04-26 DIAGNOSIS — I25.5 ISCHEMIC CARDIOMYOPATHY: ICD-10-CM

## 2019-04-26 DIAGNOSIS — I50.20 SYSTOLIC CONGESTIVE HEART FAILURE, UNSPECIFIED HF CHRONICITY (HCC): ICD-10-CM

## 2019-04-26 DIAGNOSIS — I50.20 SYSTOLIC CONGESTIVE HEART FAILURE, UNSPECIFIED HF CHRONICITY (HCC): Primary | ICD-10-CM

## 2019-04-26 DIAGNOSIS — R16.0 LIVER MASS: ICD-10-CM

## 2019-04-26 PROBLEM — I42.9 CARDIOMYOPATHY (HCC): Status: ACTIVE | Noted: 2019-04-26

## 2019-04-26 LAB
ANION GAP SERPL CALCULATED.3IONS-SCNC: 8 MMOL/L (ref 4–13)
BUN SERPL-MCNC: 36 MG/DL (ref 5–25)
CALCIUM SERPL-MCNC: 9.1 MG/DL (ref 8.3–10.1)
CHLORIDE SERPL-SCNC: 98 MMOL/L (ref 100–108)
CO2 SERPL-SCNC: 31 MMOL/L (ref 21–32)
CREAT SERPL-MCNC: 1.12 MG/DL (ref 0.6–1.3)
GFR SERPL CREATININE-BSD FRML MDRD: 49 ML/MIN/1.73SQ M
GLUCOSE SERPL-MCNC: 144 MG/DL (ref 65–140)
INR PPP: 1.62 (ref 0.86–1.17)
POTASSIUM SERPL-SCNC: 4.2 MMOL/L (ref 3.5–5.3)
PROTHROMBIN TIME: 19.3 SECONDS (ref 11.8–14.2)
SODIUM SERPL-SCNC: 137 MMOL/L (ref 136–145)

## 2019-04-26 PROCEDURE — 36415 COLL VENOUS BLD VENIPUNCTURE: CPT

## 2019-04-26 PROCEDURE — 85610 PROTHROMBIN TIME: CPT

## 2019-04-26 PROCEDURE — 99496 TRANSJ CARE MGMT HIGH F2F 7D: CPT | Performed by: INTERNAL MEDICINE

## 2019-04-26 PROCEDURE — 80048 BASIC METABOLIC PNL TOTAL CA: CPT

## 2019-05-02 ENCOUNTER — TELEPHONE (OUTPATIENT)
Dept: INTERNAL MEDICINE CLINIC | Age: 74
End: 2019-05-02

## 2019-05-02 DIAGNOSIS — R09.02 HYPOXEMIA: ICD-10-CM

## 2019-05-02 DIAGNOSIS — R91.8 MULTIPLE PULMONARY NODULES: Primary | ICD-10-CM

## 2019-05-22 ENCOUNTER — OFFICE VISIT (OUTPATIENT)
Dept: INTERNAL MEDICINE CLINIC | Age: 74
End: 2019-05-22
Payer: COMMERCIAL

## 2019-05-22 VITALS
OXYGEN SATURATION: 96 % | BODY MASS INDEX: 34.81 KG/M2 | WEIGHT: 225.6 LBS | SYSTOLIC BLOOD PRESSURE: 102 MMHG | TEMPERATURE: 98.2 F | DIASTOLIC BLOOD PRESSURE: 54 MMHG | HEART RATE: 48 BPM

## 2019-05-22 DIAGNOSIS — R16.0 LIVER MASS: ICD-10-CM

## 2019-05-22 DIAGNOSIS — I25.5 ISCHEMIC CARDIOMYOPATHY: ICD-10-CM

## 2019-05-22 DIAGNOSIS — R91.8 MULTIPLE PULMONARY NODULES: ICD-10-CM

## 2019-05-22 DIAGNOSIS — Z79.4 DIABETES MELLITUS TYPE 2, INSULIN DEPENDENT (HCC): Primary | ICD-10-CM

## 2019-05-22 DIAGNOSIS — E11.9 DIABETES MELLITUS TYPE 2, INSULIN DEPENDENT (HCC): Primary | ICD-10-CM

## 2019-05-22 PROCEDURE — 99214 OFFICE O/P EST MOD 30 MIN: CPT | Performed by: INTERNAL MEDICINE

## 2019-05-24 RX ORDER — HYDROCHLOROTHIAZIDE 25 MG/1
TABLET ORAL
Qty: 90 TABLET | Refills: 0 | OUTPATIENT
Start: 2019-05-24

## 2019-05-27 RX ORDER — AMLODIPINE BESYLATE 5 MG/1
TABLET ORAL
Qty: 90 TABLET | Refills: 0 | OUTPATIENT
Start: 2019-05-27

## 2019-06-03 RX ORDER — COLCHICINE 0.6 MG/1
TABLET ORAL
Qty: 30 TABLET | Refills: 0 | OUTPATIENT
Start: 2019-06-03

## 2019-06-13 RX ORDER — ATENOLOL 25 MG/1
TABLET ORAL
Qty: 90 TABLET | Refills: 0 | OUTPATIENT
Start: 2019-06-13

## 2019-06-20 DIAGNOSIS — E11.9 DIABETES MELLITUS TYPE 2, INSULIN DEPENDENT (HCC): ICD-10-CM

## 2019-06-20 DIAGNOSIS — Z79.4 DIABETES MELLITUS TYPE 2, INSULIN DEPENDENT (HCC): ICD-10-CM

## 2019-06-21 ENCOUNTER — TELEPHONE (OUTPATIENT)
Dept: INTERNAL MEDICINE CLINIC | Age: 74
End: 2019-06-21

## 2019-06-21 DIAGNOSIS — Z79.4 DIABETES MELLITUS TYPE 2, INSULIN DEPENDENT (HCC): ICD-10-CM

## 2019-06-21 DIAGNOSIS — E11.9 DIABETES MELLITUS TYPE 2, INSULIN DEPENDENT (HCC): ICD-10-CM

## 2019-06-21 LAB
BASOPHILS # BLD AUTO: 18 CELLS/UL (ref 0–200)
BASOPHILS NFR BLD AUTO: 0.3 %
BUN SERPL-MCNC: 18 MG/DL (ref 7–25)
BUN/CREAT SERPL: NORMAL (CALC) (ref 6–22)
CALCIUM SERPL-MCNC: 9.2 MG/DL (ref 8.6–10.4)
CHLORIDE SERPL-SCNC: 100 MMOL/L (ref 98–110)
CO2 SERPL-SCNC: 30 MMOL/L (ref 20–32)
CREAT SERPL-MCNC: 0.75 MG/DL (ref 0.6–0.93)
EOSINOPHIL # BLD AUTO: 132 CELLS/UL (ref 15–500)
EOSINOPHIL NFR BLD AUTO: 2.2 %
ERYTHROCYTE [DISTWIDTH] IN BLOOD BY AUTOMATED COUNT: 14.4 % (ref 11–15)
GLUCOSE SERPL-MCNC: 96 MG/DL (ref 65–99)
HBA1C MFR BLD: 6.8 % OF TOTAL HGB
HCT VFR BLD AUTO: 39.9 % (ref 35–45)
HGB BLD-MCNC: 13.4 G/DL (ref 11.7–15.5)
LYMPHOCYTES # BLD AUTO: 1236 CELLS/UL (ref 850–3900)
LYMPHOCYTES NFR BLD AUTO: 20.6 %
MCH RBC QN AUTO: 31.5 PG (ref 27–33)
MCHC RBC AUTO-ENTMCNC: 33.6 G/DL (ref 32–36)
MCV RBC AUTO: 93.7 FL (ref 80–100)
MONOCYTES # BLD AUTO: 774 CELLS/UL (ref 200–950)
MONOCYTES NFR BLD AUTO: 12.9 %
NEUTROPHILS # BLD AUTO: 3840 CELLS/UL (ref 1500–7800)
NEUTROPHILS NFR BLD AUTO: 64 %
PLATELET # BLD AUTO: 177 THOUSAND/UL (ref 140–400)
PMV BLD REES-ECKER: 11 FL (ref 7.5–12.5)
POTASSIUM SERPL-SCNC: 3.8 MMOL/L (ref 3.5–5.3)
RBC # BLD AUTO: 4.26 MILLION/UL (ref 3.8–5.1)
SL AMB EGFR AFRICAN AMERICAN: 92 ML/MIN/1.73M2
SL AMB EGFR NON AFRICAN AMERICAN: 79 ML/MIN/1.73M2
SODIUM SERPL-SCNC: 138 MMOL/L (ref 135–146)
WBC # BLD AUTO: 6 THOUSAND/UL (ref 3.8–10.8)

## 2019-06-22 RX ORDER — INSULIN DETEMIR 100 [IU]/ML
INJECTION, SOLUTION SUBCUTANEOUS
Qty: 6 ML | Refills: 4 | OUTPATIENT
Start: 2019-06-22

## 2019-06-24 DIAGNOSIS — E11.9 TYPE 2 DIABETES MELLITUS TREATED WITH INSULIN (HCC): ICD-10-CM

## 2019-06-24 DIAGNOSIS — Z79.4 DIABETES MELLITUS TYPE 2, INSULIN DEPENDENT (HCC): ICD-10-CM

## 2019-06-24 DIAGNOSIS — E11.9 DIABETES MELLITUS TYPE 2, INSULIN DEPENDENT (HCC): ICD-10-CM

## 2019-06-24 DIAGNOSIS — Z79.4 TYPE 2 DIABETES MELLITUS TREATED WITH INSULIN (HCC): ICD-10-CM

## 2019-06-26 DIAGNOSIS — F41.8 DEPRESSION WITH ANXIETY: ICD-10-CM

## 2019-07-02 RX ORDER — ESCITALOPRAM OXALATE 10 MG/1
TABLET ORAL
Qty: 30 TABLET | Refills: 0 | OUTPATIENT
Start: 2019-07-02

## 2019-07-05 DIAGNOSIS — Z79.4 TYPE 2 DIABETES MELLITUS TREATED WITH INSULIN (HCC): ICD-10-CM

## 2019-07-05 DIAGNOSIS — E11.9 TYPE 2 DIABETES MELLITUS TREATED WITH INSULIN (HCC): ICD-10-CM

## 2019-07-05 NOTE — TELEPHONE ENCOUNTER
SUPPLY: 90Day  PHARMACY: [facility]   PATIENT PHONE#: [phone]  LAST OV: 5/22/2019  UPCOMING OV: 8/22/2019

## 2019-07-10 DIAGNOSIS — F41.8 DEPRESSION WITH ANXIETY: ICD-10-CM

## 2019-07-11 DIAGNOSIS — F41.8 DEPRESSION WITH ANXIETY: ICD-10-CM

## 2019-07-11 RX ORDER — ESCITALOPRAM OXALATE 10 MG/1
TABLET ORAL
Qty: 30 TABLET | Refills: 0 | OUTPATIENT
Start: 2019-07-11

## 2019-07-12 DIAGNOSIS — F41.8 DEPRESSION WITH ANXIETY: ICD-10-CM

## 2019-07-12 RX ORDER — ESCITALOPRAM OXALATE 10 MG/1
10 TABLET ORAL DAILY
Qty: 90 TABLET | Refills: 1 | Status: SHIPPED | OUTPATIENT
Start: 2019-07-12

## 2019-07-12 NOTE — TELEPHONE ENCOUNTER
MED:Lexapro (10mg)  SUPPLY:90Day  PHARMACY:[facility]  PATIENT PHONE #: [phone]  LAST OV: 5/22/2019  UPCOMING OV: 8/22/2019    Patient states that she is completley out of the medication and she needs it for over the weekend  She states that she is not due for refills until Monday but she is completley out

## 2019-08-22 ENCOUNTER — APPOINTMENT (OUTPATIENT)
Dept: RADIOLOGY | Age: 74
End: 2019-08-22
Payer: MEDICARE

## 2019-08-22 ENCOUNTER — OFFICE VISIT (OUTPATIENT)
Dept: INTERNAL MEDICINE CLINIC | Age: 74
End: 2019-08-22
Payer: MEDICARE

## 2019-08-22 VITALS
HEART RATE: 82 BPM | DIASTOLIC BLOOD PRESSURE: 60 MMHG | SYSTOLIC BLOOD PRESSURE: 132 MMHG | TEMPERATURE: 97.2 F | BODY MASS INDEX: 34.41 KG/M2 | OXYGEN SATURATION: 94 % | WEIGHT: 223 LBS

## 2019-08-22 DIAGNOSIS — Z12.39 SCREENING FOR MALIGNANT NEOPLASM OF BREAST: ICD-10-CM

## 2019-08-22 DIAGNOSIS — G25.81 RESTLESS LEG: ICD-10-CM

## 2019-08-22 DIAGNOSIS — C22.0 HEPATOCELLULAR CARCINOMA (HCC): ICD-10-CM

## 2019-08-22 DIAGNOSIS — E11.9 DIABETES MELLITUS TYPE 2, INSULIN DEPENDENT (HCC): ICD-10-CM

## 2019-08-22 DIAGNOSIS — S90.111A CONTUSION OF RIGHT GREAT TOE WITHOUT DAMAGE TO NAIL, INITIAL ENCOUNTER: ICD-10-CM

## 2019-08-22 DIAGNOSIS — C34.90 PRIMARY SPINDLE CELL CARCINOMA OF LUNG (HCC): ICD-10-CM

## 2019-08-22 DIAGNOSIS — Z79.4 DIABETES MELLITUS TYPE 2, INSULIN DEPENDENT (HCC): ICD-10-CM

## 2019-08-22 DIAGNOSIS — Z12.11 SCREENING FOR COLON CANCER: Primary | ICD-10-CM

## 2019-08-22 PROCEDURE — 73630 X-RAY EXAM OF FOOT: CPT

## 2019-08-22 PROCEDURE — 99214 OFFICE O/P EST MOD 30 MIN: CPT | Performed by: INTERNAL MEDICINE

## 2019-08-22 RX ORDER — IPRATROPIUM BROMIDE AND ALBUTEROL SULFATE 2.5; .5 MG/3ML; MG/3ML
3 SOLUTION RESPIRATORY (INHALATION) 4 TIMES DAILY
Refills: 11 | COMMUNITY
Start: 2019-08-21

## 2019-08-22 RX ORDER — DEXTROMETHORPHAN HYDROBROMIDE AND PROMETHAZINE HYDROCHLORIDE 15; 6.25 MG/5ML; MG/5ML
5 SOLUTION ORAL 4 TIMES DAILY PRN
Qty: 180 ML | Refills: 1 | Status: SHIPPED | OUTPATIENT
Start: 2019-08-22 | End: 2019-09-26 | Stop reason: ALTCHOICE

## 2019-08-22 RX ORDER — CLONAZEPAM 0.5 MG/1
0.25 TABLET ORAL
Qty: 30 TABLET | Refills: 2 | Status: SHIPPED | OUTPATIENT
Start: 2019-08-22

## 2019-08-22 NOTE — PROGRESS NOTES
Assessment/Plan:  BMI Counseling: Body mass index is 34 41 kg/m²  Discussed the patient's BMI with her  The BMI is above average  No BMI follow-up plan is appropriate  Patient is currently receiving palliative care  Hepatocellular carcinoma [facility])  Patient had the recent biopsies at the [facility] and she will be seeing the Hematology-Oncology after the results of the pathology comes back    Primary spindle cell carcinoma of lung ([facility] [address] )  Lung biopsy was done I reviewed the results of the pathology patient is complaining of cough she has an appointment with Hematology-Oncology in next week or so       Diagnoses and all orders for this visit:    Screening for colon cancer  -     Ambulatory referral to Gastroenterology; Future    Diabetes mellitus type 2, insulin dependent ([facility] [address] )  -     Ambulatory referral to Ophthalmology; Future  -     Basic metabolic panel; Future  -     Hemoglobin A1C; Future    Screening for malignant neoplasm of breast  -     Mammo screening bilateral w cad; Future    Restless leg    Primary spindle cell carcinoma of lung (HCC)    Hepatocellular carcinoma (HCC)    Contusion of right great toe without damage to nail, initial encounter    Other orders  -     ipratropium-albuterol (DUO-NEB) 0 5-2 5 mg/3 mL nebulizer solution        Subjective:      Patient ID: Kenna Posada is a 68 y o  female      HPI  Patient is here for the regular follow-up after she had a biopsy of the liver and also for the lung mass she has continued to have problem with coughing she also had an EGD the symptoms of the cough started after the EGD suggest a possible aspiration she has no fever or chills I recommended cough medication and she was started on the nebulizer treatment by the pulmonary doctor on physical examination she is bilateral rhonchi and wheezing    I reviewed the results of the low liver biopsy with the patient's daughter he and also with the patient  I reviewed the biopsy of the lung with the patient's daughter  Will follow up with the hemoglobin A1c on the next visit  For contusion of the toe I recommended that she should go to the podiatrist  The following portions of the patient's history were reviewed and updated as appropriate: allergies, current medications, past family history, past medical history, past social history, past surgical history and problem list     Review of Systems   Constitutional: Negative for chills and fatigue  HENT: Negative for congestion, ear pain, hearing loss, postnasal drip, sinus pressure, sore throat and voice change  Eyes: Negative for pain, discharge and visual disturbance  Respiratory: Positive for cough and shortness of breath  Negative for chest tightness  Cardiovascular: Negative for chest pain, palpitations and leg swelling  Gastrointestinal: Negative for abdominal pain, blood in stool, diarrhea, nausea and rectal pain  Genitourinary: Negative for difficulty urinating, dysuria and urgency  Musculoskeletal: Negative for arthralgias and joint swelling  Swollen painful right big toe after fall   Skin: Negative for rash  Allergic/Immunologic: Negative for environmental allergies and food allergies  Neurological: Negative for dizziness, tremors, weakness, numbness and headaches  Restless leg mostly on the right side and also difficulty in sleep   Hematological: Negative for adenopathy  Psychiatric/Behavioral: Negative for behavioral problems and hallucinations           Past Medical History:   Diagnosis Date    Abscess of skin and subcutaneous tissue     last assessed: 1/24/2014    Anemia     Anxiety     Arthritis     Benign neoplasm of skin     last assessed: 12/3/2013    Benign neoplasm of skin of upper limb, including shoulder     last assessed: 12/3/2013    Chronic low back pain     last assessed: 12/3/2013    Depression     [facility]     Eczema     last assessed: 11/14/2014    GERD (gastroesophageal reflux disease)     Hypertension     Mental status change     last assessed: 12/3/2013    Restless leg syndrome     last assessed: 12/3/2013    Stroke ([facility] )     Type 2 diabetes mellitus ([facility] )     last assessed: 11/13/2013         Current Outpatient Medications:     acetaminophen (TYLENOL) 325 mg tablet, Take 325 mg by mouth as needed  , Disp: , Rfl:     apixaban (ELIQUIS) 5 mg, Take 5 mg by mouth 2 (two) times a day , Disp: , Rfl:     aspirin 81 MG tablet, Take 1 tablet by mouth daily  , Disp: , Rfl:     atorvastatin (LIPITOR) 80 mg tablet, Take 80 mg by mouth daily , Disp: , Rfl:     escitalopram (LEXAPRO) 10 mg tablet, Take 1 tablet (10 mg total) by mouth daily, Disp: 90 tablet, Rfl: 1    insulin detemir (LEVEMIR FLEXTOUCH) 100 Units/mL injection pen, Inject 34 Units under the skin every morning, Disp: 5 pen, Rfl: 5    Insulin Pen Needle (NOVOFINE PLUS) 32G X 4 MM MISC, Use 1 time daily with insulin pen, Disp: 100 each, Rfl: 1    ipratropium-albuterol (DUO-NEB) 0 5-2 5 mg/3 mL nebulizer solution, , Disp: , Rfl: 11    losartan (COZAAR) 25 mg tablet, Take 25 mg by mouth 2 (two) times a day , Disp: , Rfl:     metFORMIN (GLUCOPHAGE) 1000 MG tablet, Take 1 tablet (1,000 mg total) by mouth 2 (two) times a day, Disp: 180 tablet, Rfl: 1    metoprolol succinate (TOPROL-XL) 100 mg 24 hr tablet, Take 75 mg by mouth 1 5 tablets daily, Disp: , Rfl:     nystatin (MYCOSTATIN) powder, Apply topically as needed  , Disp: , Rfl:     potassium chloride (K-DUR,KLOR-CON) 20 mEq tablet, Take 20 mEq by mouth daily , Disp: , Rfl:     simvastatin (ZOCOR) 40 mg tablet, Take 1 tablet (40 mg total) by mouth daily, Disp: 90 tablet, Rfl: 1    spironolactone (ALDACTONE) 25 mg tablet, Take 25 mg by mouth daily , Disp: , Rfl:     torsemide (DEMADEX) 20 mg tablet, Take 20 mg by mouth 2 tablets daily, Disp: , Rfl:     Allergies   Allergen Reactions    Cefdinir      Category:  Allergy;     Penicillins     Prednisone Agression    Lisinopril Rash     Category: Allergy; Category: Allergy; [employer] - [id number]: ALL FORMS    Sulfa Antibiotics Rash       Social History   Past Surgical History:   Procedure Laterality Date    APPENDECTOMY      CHOLECYSTECTOMY      EAR SURGERY Left     HERNIA REPAIR      ROTATOR CUFF REPAIR Right     done by Dr Pa Rader       Family History   Adopted: Yes   Problem Relation Age of Onset    No Known Problems Mother     No Known Problems Father        Objective:  /60 (BP Location: Left arm, Patient Position: Sitting, Cuff Size: Standard)   Pulse 82   Temp (!) 97 2 °F (36 2 °C) (Tympanic)   Wt 101 kg (223 lb)   SpO2 94%   BMI 34 41 kg/m²        Physical Exam   Constitutional: She is oriented to person, place, and time  She appears well-developed and well-nourished  HENT:   Right Ear: External ear normal    Mouth/Throat: Oropharynx is clear and moist    Eyes: Pupils are equal, round, and reactive to light  Conjunctivae and EOM are normal    Neck: Normal range of motion  No JVD present  No thyromegaly present  Cardiovascular: Normal rate, regular rhythm, normal heart sounds and intact distal pulses  Pulmonary/Chest: Breath sounds normal    Decreased breath sounds bilaterally with diffuse expiratory rhonchi and wheeze she is status post lung mass biopsy   Abdominal: Soft  Bowel sounds are normal    Musculoskeletal: Normal range of motion  Very tender swollen maybe slightly warm right big toe need to see the foot doctor I will see her back in about 6 week but she need to see the foot doctor if there is any discoloration or poor circulation of that toe she is diabetic and also she had a peripheral vascular disease   Lymphadenopathy:     She has no cervical adenopathy  Neurological: She is alert and oriented to person, place, and time  She has normal reflexes  Skin: Skin is dry     Psychiatric: She has a normal mood and affect  Her behavior is normal  Judgment and thought content normal          No results found for this or any previous visit (from the past 672 hour(s))

## 2019-08-22 NOTE — ASSESSMENT & PLAN NOTE
Patient fell and the had a contusion of the big toe the big toe is swollen red and very tender very limited movement I will get the x-ray of foot and also specially attention on the big toe I did the immobilize the toe also recommended to see the Podiatry

## 2019-08-22 NOTE — ASSESSMENT & PLAN NOTE
Restless leg syndrome patient having lot of problem mostly from the right leg also she has a significant issue with the sleeping recommend to her that low-dose of Klonopin may help for both we will start her on 0 25 mg at bedtime and may be increased 0 25 if needed

## 2019-08-22 NOTE — ASSESSMENT & PLAN NOTE
Patient had the recent biopsies at the [facility] and she will be seeing the Hematology-Oncology after the results of the pathology comes back

## 2019-08-22 NOTE — ASSESSMENT & PLAN NOTE
Lung biopsy was done I reviewed the results of the pathology patient is complaining of cough she has an appointment with Hematology-Oncology in next week or so

## 2019-08-23 ENCOUNTER — TELEPHONE (OUTPATIENT)
Dept: INTERNAL MEDICINE CLINIC | Age: 74
End: 2019-08-23

## 2019-08-23 NOTE — TELEPHONE ENCOUNTER
I called and discussed patient's foot x-ray results with her  She does have a fracture of the right 1st toe and I counseled her to call Podiatry and make an appointment to go and see them as soon as possible  She states that she has a referral already  She states that her pain is well controlled on Tylenol  She expressed her understanding and agreement with the plan

## 2019-08-26 ENCOUNTER — TELEPHONE (OUTPATIENT)
Dept: INTERNAL MEDICINE CLINIC | Age: 74
End: 2019-08-26

## 2019-08-26 NOTE — TELEPHONE ENCOUNTER
Call the and left a message for her regarding the fracture    If the toe is not doing good we need to see the orthopedic doctor otherwise she will see me in a regular appointment and keep her toe immobilize

## 2019-09-20 LAB
BUN SERPL-MCNC: 26 MG/DL (ref 7–25)
BUN/CREAT SERPL: 33 (CALC) (ref 6–22)
CALCIUM SERPL-MCNC: 9.5 MG/DL (ref 8.6–10.4)
CHLORIDE SERPL-SCNC: 102 MMOL/L (ref 98–110)
CO2 SERPL-SCNC: 28 MMOL/L (ref 20–32)
CREAT SERPL-MCNC: 0.78 MG/DL (ref 0.6–0.93)
GLUCOSE SERPL-MCNC: 128 MG/DL (ref 65–99)
HBA1C MFR BLD: 6.8 % OF TOTAL HGB
POTASSIUM SERPL-SCNC: 4.4 MMOL/L (ref 3.5–5.3)
SL AMB EGFR AFRICAN AMERICAN: 87 ML/MIN/1.73M2
SL AMB EGFR NON AFRICAN AMERICAN: 75 ML/MIN/1.73M2
SODIUM SERPL-SCNC: 140 MMOL/L (ref 135–146)

## 2019-09-26 ENCOUNTER — OFFICE VISIT (OUTPATIENT)
Dept: INTERNAL MEDICINE CLINIC | Facility: CLINIC | Age: 74
End: 2019-09-26
Payer: MEDICARE

## 2019-09-26 VITALS
TEMPERATURE: 98.7 F | WEIGHT: 221 LBS | BODY MASS INDEX: 34.69 KG/M2 | HEART RATE: 68 BPM | SYSTOLIC BLOOD PRESSURE: 130 MMHG | DIASTOLIC BLOOD PRESSURE: 68 MMHG | HEIGHT: 67 IN | OXYGEN SATURATION: 97 %

## 2019-09-26 DIAGNOSIS — Z79.4 DIABETES MELLITUS TYPE 2, INSULIN DEPENDENT (HCC): Primary | ICD-10-CM

## 2019-09-26 DIAGNOSIS — E11.9 DIABETES MELLITUS TYPE 2, INSULIN DEPENDENT (HCC): Primary | ICD-10-CM

## 2019-09-26 PROCEDURE — 99214 OFFICE O/P EST MOD 30 MIN: CPT | Performed by: INTERNAL MEDICINE

## 2019-09-26 RX ORDER — LORAZEPAM 0.5 MG/1
0.5 TABLET ORAL 2 TIMES DAILY PRN
COMMUNITY
Start: 2019-09-19 | End: 2019-09-29

## 2019-09-26 NOTE — PROGRESS NOTES
Assessment/Plan:  BMI Counseling: Body mass index is 34 61 kg/m²  The BMI is above normal  Nutrition recommendations include reducing portion sizes, decreasing overall calorie intake, 3-5 servings of fruits/vegetables daily, reducing fast food intake, consuming healthier snacks and decreasing soda and/or juice intake  Exercise recommendations include exercising 3-5 times per week  Hepatocellular carcinoma ([facility] [address] )  Patient was diagnosed with the hepatocellular carcinoma with lung metastasis she is seeing the Hematology-Oncology and the also Radiation Oncology she is going for radiation to the lung and also to the liver with the intra hepatic chemotherapy she is not very anxious about the talking about the immunotherapy but overall she is doing very well amazingly better with all these problems    Diabetes mellitus type 2, insulin dependent (HCC)    Lab Results   Component Value Date    HGBA1C 6 8 (H) 09/19/2019    Diabetes is very well controlled with hemoglobin A1c 6 8 she lost some weight because of her problems with the overall cancer and also she is keeping an eye on her diet she is very anxious to live longer and have good quality of life    Benign essential hypertension  Hypertension is very well controlled continue with the same medication no changes    [facility] to [facility]  Status post acute MI and coronary artery disease she is doing stable no chest pain ordered a by the cardiologist        Diagnoses and all orders for this visit:    Diabetes mellitus type 2, insulin dependent ([facility] )  -     Comprehensive metabolic panel; Future  -     Hemoglobin A1C; Future  -     CBC and differential; Future    Other orders  -     LORazepam (ATIVAN) 0 5 mg tablet; Take 0 5 mg by mouth 2 (two) times a day as needed        Subjective:   Chief Complaint   Patient presents with    Follow-up     6 week follow  Discuss flu, pneo, and shingels shot due to started cancer radiation treatment   Depression    Diabetes    Hypertension        Patient ID: López Malave is a 68 y o  female  HPI  This is a 68 years young lady with a history of the the stage IV hepatocellular carcinoma she is followed up by the Hematology-Oncology as given above she will be going for the radiation the and intra hepatic chemotherapy  Hypertension is very well controlled continue with the same medication no changes  Hyper cholesterolemia with check the blood workup for cholesterol next office visit she has no problems right now  Type 2 diabetes mellitus is very well controlled because of the weight loss and her medication the  The following portions of the patient's history were reviewed and updated as appropriate: allergies, current medications, past family history, past medical history, past social history, past surgical history and problem list     Review of Systems   Constitutional: Negative for chills and fatigue  HENT: Negative for congestion, ear pain, hearing loss, postnasal drip, sinus pressure, sore throat and voice change  Eyes: Negative for pain, discharge and visual disturbance  Respiratory: Negative for cough, chest tightness and shortness of breath  Cardiovascular: Negative for chest pain, palpitations and leg swelling  Gastrointestinal: Negative for abdominal pain, blood in stool, diarrhea, nausea and rectal pain  Genitourinary: Negative for difficulty urinating, dysuria and urgency  Musculoskeletal: Negative for arthralgias and joint swelling  Foot getting better she was seen by the Podiatry   Skin: Negative for rash  Allergic/Immunologic: Negative for environmental allergies and food allergies  Neurological: Negative for dizziness, tremors, weakness, numbness and headaches  Hematological: Negative for adenopathy  Psychiatric/Behavioral: Negative for behavioral problems and hallucinations           Past Medical History:   Diagnosis Date    Abscess of skin and subcutaneous tissue     last assessed: 1/24/2014    Anemia     Anxiety     Arthritis     Benign neoplasm of skin     last assessed: 12/3/2013    Benign neoplasm of skin of upper limb, including shoulder     last assessed: 12/3/2013    Chronic low back pain     last assessed: 12/3/2013    Depression     Diabetes mellitus ([facility] )     Eczema     last assessed: 11/14/2014    GERD (gastroesophageal reflux disease)     Hypertension     Mental status change     last assessed: 12/3/2013    Restless leg syndrome     last assessed: 12/3/2013    Stroke ([place] [address] )     Type 2 diabetes mellitus ([place] [address] )     last assessed: 11/13/2013         Current Outpatient Medications:     acetaminophen (TYLENOL) 325 mg tablet, Take 325 mg by mouth as needed  , Disp: , Rfl:     apixaban (ELIQUIS) 5 mg, Take 5 mg by mouth 2 (two) times a day , Disp: , Rfl:     aspirin 81 MG tablet, Take 1 tablet by mouth daily  , Disp: , Rfl:     atorvastatin (LIPITOR) 80 mg tablet, Take 80 mg by mouth daily , Disp: , Rfl:     clonazePAM (KlonoPIN) 0 5 mg tablet, Take 0 5 tablets (0 25 mg total) by mouth daily at bedtime, Disp: 30 tablet, Rfl: 2    escitalopram (LEXAPRO) 10 mg tablet, Take 1 tablet (10 mg total) by mouth daily, Disp: 90 tablet, Rfl: 1    insulin detemir (LEVEMIR FLEXTOUCH) 100 Units/mL injection pen, Inject 34 Units under the skin every morning, Disp: 5 pen, Rfl: 5    Insulin Pen Needle (NOVOFINE PLUS) 32G X 4 MM MISC, Use 1 time daily with insulin pen, Disp: 100 each, Rfl: 1    ipratropium-albuterol (DUO-NEB) 0 5-2 5 mg/3 mL nebulizer solution, , Disp: , Rfl: 11    LORazepam (ATIVAN) 0 5 mg tablet, Take 0 5 mg by mouth 2 (two) times a day as needed, Disp: , Rfl:     losartan (COZAAR) 50 mg tablet, Take 50 mg by mouth daily , Disp: , Rfl:     metFORMIN (GLUCOPHAGE) 1000 MG tablet, Take 1 tablet (1,000 mg total) by mouth 2 (two) times a day, Disp: 180 tablet, Rfl: 1    metoprolol succinate (TOPROL-XL) 100 mg 24 hr tablet, Take 75 mg by mouth 1 5 tablets daily, Disp: , Rfl:     nystatin (MYCOSTATIN) powder, Apply topically as needed  , Disp: , Rfl:     potassium chloride (K-DUR,KLOR-CON) 20 mEq tablet, Take 20 mEq by mouth daily , Disp: , Rfl:     simvastatin (ZOCOR) 40 mg tablet, Take 1 tablet (40 mg total) by mouth daily, Disp: 90 tablet, Rfl: 1    spironolactone (ALDACTONE) 25 mg tablet, Take 25 mg by mouth daily , Disp: , Rfl:     torsemide (DEMADEX) 20 mg tablet, Take 20 mg by mouth 2 tablets daily, Disp: , Rfl:     Allergies   Allergen Reactions    Cefdinir      Category: Allergy;     Penicillins     Prednisone      Agression    Lisinopril Rash     Category: Allergy; Category: Allergy; Annotation - [id number]: ALL FORMS    Sulfa Antibiotics Rash       Social History   Past Surgical History:   Procedure Laterality Date    APPENDECTOMY      CHOLECYSTECTOMY      EAR SURGERY Left     HERNIA REPAIR      ROTATOR CUFF REPAIR Right     done by Dr Onur Horner       Family History   Adopted: Yes   Problem Relation Age of Onset    No Known Problems Mother     No Known Problems Father        Objective:  /68 (BP Location: Left arm, Patient Position: Sitting, Cuff Size: Large)   Pulse 68   Temp 98 7 °F (37 1 °C) (Oral)   Ht 5' 7" (1 702 m)   Wt 100 kg (221 lb)   SpO2 97%   BMI 34 61 kg/m²        Physical Exam   Constitutional: She is oriented to person, place, and time  She appears well-developed and well-nourished  HENT:   Right Ear: External ear normal    Mouth/Throat: Oropharynx is clear and moist    Eyes: Pupils are equal, round, and reactive to light  Conjunctivae and EOM are normal    Neck: Normal range of motion  No JVD present  No thyromegaly present  Cardiovascular: Normal rate, regular rhythm, normal heart sounds and intact distal pulses  Pulmonary/Chest: Breath sounds normal    Abdominal: Soft   Bowel sounds are normal    Musculoskeletal: Normal range of motion  She his dry skin and the onychomycosis of the toes her to is doing much better no redness no swelling   Lymphadenopathy:     She has no cervical adenopathy  Neurological: She is alert and oriented to person, place, and time  She has normal reflexes  Skin: Skin is dry  Psychiatric: She has a normal mood and affect   Her behavior is normal  Judgment and thought content normal          Recent Results (from the past 672 hour(s))   Basic metabolic panel    Collection Time: 09/19/19  9:11 AM   Result Value Ref Range    Glucose, Random 128 (H) 65 - 99 mg/dL    BUN 26 (H) 7 - 25 mg/dL    Creatinine 0 78 0 60 - 0 93 mg/dL    eGFR Non  75 > OR = 60 mL/min/1 73m2    eGFR  87 > OR = 60 mL/min/1 73m2    SL AMB BUN/CREATININE RATIO 33 (H) 6 - 22 (calc)    Sodium 140 135 - 146 mmol/L    Potassium 4 4 3 5 - 5 3 mmol/L    Chloride 102 98 - 110 mmol/L    CO2 28 20 - 32 mmol/L    SL AMB CALCIUM 9 5 8 6 - 10 4 mg/dL   Hemoglobin A1c (w/out EAG)    Collection Time: 09/19/19  9:11 AM   Result Value Ref Range    Hemoglobin A1C 6 8 (H) <5 7 % of total Hgb

## 2019-09-26 NOTE — ASSESSMENT & PLAN NOTE
Lab Results   Component Value Date    HGBA1C 6 8 (H) 09/19/2019    Diabetes is very well controlled with hemoglobin A1c 6 8 she lost some weight because of her problems with the overall cancer and also she is keeping an eye on her diet she is very anxious to live longer and have good quality of life

## 2019-09-26 NOTE — ASSESSMENT & PLAN NOTE
Patient was diagnosed with the hepatocellular carcinoma with lung metastasis she is seeing the Hematology-Oncology and the also Radiation Oncology she is going for radiation to the lung and also to the liver with the intra hepatic chemotherapy she is not very anxious about the talking about the immunotherapy but overall she is doing very well amazingly better with all these problems

## 2019-09-26 NOTE — ASSESSMENT & PLAN NOTE
Status post acute MI and coronary artery disease she is doing stable no chest pain ordered a by the cardiologist

## 2019-10-18 ENCOUNTER — TRANSITIONAL CARE MANAGEMENT (OUTPATIENT)
Dept: INTERNAL MEDICINE CLINIC | Facility: CLINIC | Age: 74
End: 2019-10-18

## 2019-10-18 RX ORDER — LOSARTAN POTASSIUM 50 MG/1
25 TABLET ORAL 2 TIMES DAILY
COMMUNITY
Start: 2019-10-04

## 2019-11-14 ENCOUNTER — TELEPHONE (OUTPATIENT)
Dept: INTERNAL MEDICINE CLINIC | Age: 74
End: 2019-11-14

## 2019-11-14 NOTE — TELEPHONE ENCOUNTER
Daughter called and asked if you could order something for a "yeast infection"  Said her mom was on multiple ABX and this "always happens"  Please advise

## 2019-11-19 NOTE — TELEPHONE ENCOUNTER
Patient's daughter called 5 days ago  Is there anything you can order for this patient for a "yeast" infection or does she need an appointment? Thank you

## 2019-11-21 DIAGNOSIS — B37.3 VAGINAL CANDIDIASIS: Primary | ICD-10-CM

## 2019-11-21 RX ORDER — FLUCONAZOLE 150 MG/1
150 TABLET ORAL ONCE
Qty: 1 TABLET | Refills: 0 | Status: SHIPPED | OUTPATIENT
Start: 2019-11-21 | End: 2019-11-21

## 2019-12-06 LAB
ALBUMIN SERPL-MCNC: 4 G/DL (ref 3.6–5.1)
ALBUMIN/GLOB SERPL: 1.4 (CALC) (ref 1–2.5)
ALP SERPL-CCNC: 74 U/L (ref 33–130)
ALT SERPL-CCNC: 12 U/L (ref 6–29)
AST SERPL-CCNC: 12 U/L (ref 10–35)
BASOPHILS # BLD AUTO: 27 CELLS/UL (ref 0–200)
BASOPHILS NFR BLD AUTO: 0.4 %
BILIRUB SERPL-MCNC: 1 MG/DL (ref 0.2–1.2)
BUN SERPL-MCNC: 21 MG/DL (ref 7–25)
BUN/CREAT SERPL: ABNORMAL (CALC) (ref 6–22)
CALCIUM SERPL-MCNC: 9.8 MG/DL (ref 8.6–10.4)
CHLORIDE SERPL-SCNC: 101 MMOL/L (ref 98–110)
CO2 SERPL-SCNC: 30 MMOL/L (ref 20–32)
CREAT SERPL-MCNC: 0.8 MG/DL (ref 0.6–0.93)
EOSINOPHIL # BLD AUTO: 442 CELLS/UL (ref 15–500)
EOSINOPHIL NFR BLD AUTO: 6.5 %
ERYTHROCYTE [DISTWIDTH] IN BLOOD BY AUTOMATED COUNT: 13 % (ref 11–15)
GLOBULIN SER CALC-MCNC: 2.8 G/DL (CALC) (ref 1.9–3.7)
GLUCOSE SERPL-MCNC: 121 MG/DL (ref 65–99)
HBA1C MFR BLD: 7 % OF TOTAL HGB
HCT VFR BLD AUTO: 38.3 % (ref 35–45)
HGB BLD-MCNC: 12.4 G/DL (ref 11.7–15.5)
LYMPHOCYTES # BLD AUTO: 836 CELLS/UL (ref 850–3900)
LYMPHOCYTES NFR BLD AUTO: 12.3 %
MCH RBC QN AUTO: 29.2 PG (ref 27–33)
MCHC RBC AUTO-ENTMCNC: 32.4 G/DL (ref 32–36)
MCV RBC AUTO: 90.3 FL (ref 80–100)
MONOCYTES # BLD AUTO: 1136 CELLS/UL (ref 200–950)
MONOCYTES NFR BLD AUTO: 16.7 %
NEUTROPHILS # BLD AUTO: 4359 CELLS/UL (ref 1500–7800)
NEUTROPHILS NFR BLD AUTO: 64.1 %
PLATELET # BLD AUTO: 236 THOUSAND/UL (ref 140–400)
PMV BLD REES-ECKER: 10.3 FL (ref 7.5–12.5)
POTASSIUM SERPL-SCNC: 4 MMOL/L (ref 3.5–5.3)
PROT SERPL-MCNC: 6.8 G/DL (ref 6.1–8.1)
RBC # BLD AUTO: 4.24 MILLION/UL (ref 3.8–5.1)
SL AMB EGFR AFRICAN AMERICAN: 84 ML/MIN/1.73M2
SL AMB EGFR NON AFRICAN AMERICAN: 73 ML/MIN/1.73M2
SODIUM SERPL-SCNC: 139 MMOL/L (ref 135–146)
WBC # BLD AUTO: 6.8 THOUSAND/UL (ref 3.8–10.8)

## 2019-12-16 ENCOUNTER — TRANSITIONAL CARE MANAGEMENT (OUTPATIENT)
Dept: INTERNAL MEDICINE CLINIC | Facility: CLINIC | Age: 74
End: 2019-12-16
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
## 2019-12-26 DIAGNOSIS — F41.8 DEPRESSION WITH ANXIETY: ICD-10-CM

## 2019-12-26 RX ORDER — ESCITALOPRAM OXALATE 10 MG/1
TABLET ORAL
Qty: 90 TABLET | Refills: 0 | OUTPATIENT
Start: 2019-12-26